# Patient Record
Sex: FEMALE | Race: WHITE | HISPANIC OR LATINO | Employment: FULL TIME | URBAN - METROPOLITAN AREA
[De-identification: names, ages, dates, MRNs, and addresses within clinical notes are randomized per-mention and may not be internally consistent; named-entity substitution may affect disease eponyms.]

---

## 2022-03-31 ENCOUNTER — OFFICE VISIT (OUTPATIENT)
Dept: MEDICAL GROUP | Facility: PHYSICIAN GROUP | Age: 57
End: 2022-03-31
Payer: COMMERCIAL

## 2022-03-31 VITALS
BODY MASS INDEX: 37.54 KG/M2 | HEART RATE: 83 BPM | TEMPERATURE: 97.8 F | RESPIRATION RATE: 16 BRPM | OXYGEN SATURATION: 96 % | DIASTOLIC BLOOD PRESSURE: 80 MMHG | SYSTOLIC BLOOD PRESSURE: 124 MMHG | WEIGHT: 233.6 LBS | HEIGHT: 66 IN

## 2022-03-31 DIAGNOSIS — R63.5 WEIGHT GAIN: ICD-10-CM

## 2022-03-31 DIAGNOSIS — L08.9 SKIN INFECTION: ICD-10-CM

## 2022-03-31 DIAGNOSIS — E55.9 VITAMIN D DEFICIENCY: ICD-10-CM

## 2022-03-31 DIAGNOSIS — F32.A DEPRESSION, UNSPECIFIED DEPRESSION TYPE: ICD-10-CM

## 2022-03-31 DIAGNOSIS — Z00.00 WELLNESS EXAMINATION: ICD-10-CM

## 2022-03-31 DIAGNOSIS — R06.83 SNORES: ICD-10-CM

## 2022-03-31 DIAGNOSIS — N20.0 KIDNEY STONE: ICD-10-CM

## 2022-03-31 PROCEDURE — 99204 OFFICE O/P NEW MOD 45 MIN: CPT | Performed by: FAMILY MEDICINE

## 2022-03-31 RX ORDER — CEPHALEXIN 500 MG/1
500 CAPSULE ORAL 2 TIMES DAILY
Qty: 14 CAPSULE | Refills: 0 | Status: SHIPPED | OUTPATIENT
Start: 2022-03-31 | End: 2022-04-07

## 2022-03-31 ASSESSMENT — PATIENT HEALTH QUESTIONNAIRE - PHQ9
CLINICAL INTERPRETATION OF PHQ2 SCORE: 2
SUM OF ALL RESPONSES TO PHQ QUESTIONS 1-9: 9
5. POOR APPETITE OR OVEREATING: 1 - SEVERAL DAYS

## 2022-03-31 NOTE — PROGRESS NOTES
Subjective:     CC:    Chief Complaint   Patient presents with   • Establish Care       HISTORY OF THE PRESENT ILLNESS: Patient is a 56 y.o. female. This pleasant patient is here today to establish care.  Patient has a number of complaints she is really feeling tired and at times depressed.  Patient is nearing the anniversary of her mother's death and she feels like this is playing on her depression.  Patient does not want to be on any medication for this but is willing to have some counseling done.  Patient is also having problems with weight gain and fatigue at times.  Patient does have a history of a kidney stone in the past x2 and it sounds like she may have had a stent placement for possible removal of the stone.  Patient is not have any issues issues with pain at this time.  Patient does have a red lump on her left breast that she noted there was some whitish material she would like me to take a look at it this is been going on for the last couple weeks    No problem-specific Assessment & Plan notes found for this encounter.      Allergies: Patient has no allergy information on record.    Current Outpatient Medications Ordered in Epic   Medication Sig Dispense Refill   • cephALEXin (KEFLEX) 500 MG Cap Take 1 Capsule by mouth 2 times a day for 7 days. 14 Capsule 0     No current Epic-ordered facility-administered medications on file.       History reviewed. No pertinent past medical history.    Past Surgical History:   Procedure Laterality Date   • CYSTOSCOPY STENT PLACEMENT      This was probably removed but this was due to a kidney stone       Social History     Tobacco Use   • Smoking status: Current Every Day Smoker     Packs/day: 0.50     Types: Cigarettes   • Smokeless tobacco: Never Used   Vaping Use   • Vaping Use: Never used   Substance Use Topics   • Alcohol use: Yes     Comment: Occ   • Drug use: Never       Social History     Social History Narrative   • Not on file       Family History   Problem  "Relation Age of Onset   • Breast Cancer Mother    • Breast Cancer Maternal Grandmother    • Diabetes Paternal Grandmother        Health Maintenance: Completed    ROS:   Gen: no fevers/chills  Eyes: no changes in vision  ENT: no sore throat, no hearing loss, no bloody nose  Pulm: no sob, no cough  CV: no chest pain, no palpitations  GI: no nausea/vomiting, no diarrhea  : no dysuria  Neuro: no headaches, no numbness/tingling  Heme/Lymph: no easy bruising      Objective:     Exam: /80   Pulse 83   Temp 36.6 °C (97.8 °F)   Resp 16   Ht 1.68 m (5' 6.14\")   Wt 106 kg (233 lb 9.6 oz)   SpO2 96%  Body mass index is 37.54 kg/m².    Gen: Alert and oriented, No apparent distress.  Skin: Warm and dry.  No obvious lesions.  Eyes: Sclera wnl Pupils normal in size  Lungs: Normal effort, CTA bilaterally, no wheezes, rhonchi, or rales  CV: Regular rate and rhythm. No murmurs, rubs, or gallops.  Breasts: Patient did show me her left breast and there is a small red area noted that slightly tender looks like a localized infection about the size of a quarter.  Musculoskeletal: Normal gait. No extremity cyanosis, clubbing, or edema.  Neuro: Oriented to person, place and time  Psych: Mood is wnl     Labs: Fasting labs were ordered patient given a listing of all the [facility] labs    Assessment & Plan:   56 y.o. female with the following -    1. Kidney stone  I would recommend we get a uric acid and also urinalysis patient is asymptomatic at this time this is a chronic problem  - URIC ACID; Future  - URINALYSIS,CULTURE IF INDICATED; Future    2. Weight gain  I would recommend that we get lab work done this is a chronic problem  - TSH; Future  - TRIIDOTHYRONINE; Future    3. Skin infection  I will place patient on antibiotics next time I see her we will need to do a breast exam and at that time order a mammogram this is a acute problem  - CBC WITH DIFFERENTIAL; Future  - Comp Metabolic Panel; Future    4. Depression, unspecified " depression type  We will get lab work done I recommended possible medication but patient does not want to be on any meds.  So patient was agreeable for counseling we will go ahead and order this this is a chronic problem  - Comp Metabolic Panel; Future  - TSH; Future  - TRIIDOTHYRONINE; Future    5. Wellness examination  - Lipid Profile; Future    6. Vitamin D deficiency  We will go ahead and check her vitamin D level this is a chronic problem  - VITAMIN D,25 HYDROXY; Future    Other orders  - cephALEXin (KEFLEX) 500 MG Cap; Take 1 Capsule by mouth 2 times a day for 7 days.  Dispense: 14 Capsule; Refill: 0       Return in about 2 weeks (around 4/14/2022).    Please note that this dictation was created using voice recognition software. I have made every reasonable attempt to correct obvious errors, but I expect that there are errors of grammar and possibly content that I did not discover before finalizing the note.

## 2022-04-02 ENCOUNTER — HOSPITAL ENCOUNTER (OUTPATIENT)
Dept: LAB | Facility: MEDICAL CENTER | Age: 57
End: 2022-04-02
Attending: FAMILY MEDICINE
Payer: COMMERCIAL

## 2022-04-02 DIAGNOSIS — L08.9 SKIN INFECTION: ICD-10-CM

## 2022-04-02 DIAGNOSIS — R63.5 WEIGHT GAIN: ICD-10-CM

## 2022-04-02 DIAGNOSIS — N20.0 KIDNEY STONE: ICD-10-CM

## 2022-04-02 DIAGNOSIS — E55.9 VITAMIN D DEFICIENCY: ICD-10-CM

## 2022-04-02 DIAGNOSIS — Z00.00 WELLNESS EXAMINATION: ICD-10-CM

## 2022-04-02 DIAGNOSIS — F32.A DEPRESSION, UNSPECIFIED DEPRESSION TYPE: ICD-10-CM

## 2022-04-02 LAB
25(OH)D3 SERPL-MCNC: 21 NG/ML (ref 30–100)
ALBUMIN SERPL BCP-MCNC: 4.4 G/DL (ref 3.2–4.9)
ALBUMIN/GLOB SERPL: 1.7 G/DL
ALP SERPL-CCNC: 85 U/L (ref 30–99)
ALT SERPL-CCNC: 14 U/L (ref 2–50)
ANION GAP SERPL CALC-SCNC: 11 MMOL/L (ref 7–16)
APPEARANCE UR: CLEAR
AST SERPL-CCNC: 14 U/L (ref 12–45)
BACTERIA #/AREA URNS HPF: NEGATIVE /HPF
BASOPHILS # BLD AUTO: 0.8 % (ref 0–1.8)
BASOPHILS # BLD: 0.07 K/UL (ref 0–0.12)
BILIRUB SERPL-MCNC: 0.6 MG/DL (ref 0.1–1.5)
BILIRUB UR QL STRIP.AUTO: NEGATIVE
BUN SERPL-MCNC: 15 MG/DL (ref 8–22)
CALCIUM SERPL-MCNC: 9.6 MG/DL (ref 8.5–10.5)
CHLORIDE SERPL-SCNC: 108 MMOL/L (ref 96–112)
CHOLEST SERPL-MCNC: 184 MG/DL (ref 100–199)
CO2 SERPL-SCNC: 23 MMOL/L (ref 20–33)
COLOR UR: YELLOW
CREAT SERPL-MCNC: 0.67 MG/DL (ref 0.5–1.4)
EOSINOPHIL # BLD AUTO: 0.36 K/UL (ref 0–0.51)
EOSINOPHIL NFR BLD: 4 % (ref 0–6.9)
EPI CELLS #/AREA URNS HPF: ABNORMAL /HPF
ERYTHROCYTE [DISTWIDTH] IN BLOOD BY AUTOMATED COUNT: 50.6 FL (ref 35.9–50)
GFR SERPLBLD CREATININE-BSD FMLA CKD-EPI: 104 ML/MIN/1.73 M 2
GLOBULIN SER CALC-MCNC: 2.6 G/DL (ref 1.9–3.5)
GLUCOSE SERPL-MCNC: 97 MG/DL (ref 65–99)
GLUCOSE UR STRIP.AUTO-MCNC: NEGATIVE MG/DL
HCT VFR BLD AUTO: 48.6 % (ref 37–47)
HDLC SERPL-MCNC: 49 MG/DL
HGB BLD-MCNC: 15.9 G/DL (ref 12–16)
HYALINE CASTS #/AREA URNS LPF: ABNORMAL /LPF
IMM GRANULOCYTES # BLD AUTO: 0.03 K/UL (ref 0–0.11)
IMM GRANULOCYTES NFR BLD AUTO: 0.3 % (ref 0–0.9)
KETONES UR STRIP.AUTO-MCNC: NEGATIVE MG/DL
LDLC SERPL CALC-MCNC: 118 MG/DL
LEUKOCYTE ESTERASE UR QL STRIP.AUTO: NEGATIVE
LYMPHOCYTES # BLD AUTO: 2.97 K/UL (ref 1–4.8)
LYMPHOCYTES NFR BLD: 32.9 % (ref 22–41)
MCH RBC QN AUTO: 31.5 PG (ref 27–33)
MCHC RBC AUTO-ENTMCNC: 32.7 G/DL (ref 33.6–35)
MCV RBC AUTO: 96.4 FL (ref 81.4–97.8)
MICRO URNS: ABNORMAL
MONOCYTES # BLD AUTO: 0.48 K/UL (ref 0–0.85)
MONOCYTES NFR BLD AUTO: 5.3 % (ref 0–13.4)
NEUTROPHILS # BLD AUTO: 5.13 K/UL (ref 2–7.15)
NEUTROPHILS NFR BLD: 56.7 % (ref 44–72)
NITRITE UR QL STRIP.AUTO: NEGATIVE
NRBC # BLD AUTO: 0 K/UL
NRBC BLD-RTO: 0 /100 WBC
PH UR STRIP.AUTO: 5.5 [PH] (ref 5–8)
PLATELET # BLD AUTO: 279 K/UL (ref 164–446)
PMV BLD AUTO: 10.2 FL (ref 9–12.9)
POTASSIUM SERPL-SCNC: 4.2 MMOL/L (ref 3.6–5.5)
PROT SERPL-MCNC: 7 G/DL (ref 6–8.2)
PROT UR QL STRIP: 30 MG/DL
RBC # BLD AUTO: 5.04 M/UL (ref 4.2–5.4)
RBC # URNS HPF: ABNORMAL /HPF
RBC UR QL AUTO: ABNORMAL
SODIUM SERPL-SCNC: 142 MMOL/L (ref 135–145)
SP GR UR STRIP.AUTO: 1.02
T3 SERPL-MCNC: 123 NG/DL (ref 60–181)
TRIGL SERPL-MCNC: 85 MG/DL (ref 0–149)
TSH SERPL DL<=0.005 MIU/L-ACNC: 1.41 UIU/ML (ref 0.38–5.33)
URATE SERPL-MCNC: 4.5 MG/DL (ref 1.9–8.2)
UROBILINOGEN UR STRIP.AUTO-MCNC: 0.2 MG/DL
WBC # BLD AUTO: 9 K/UL (ref 4.8–10.8)
WBC #/AREA URNS HPF: ABNORMAL /HPF

## 2022-04-02 PROCEDURE — 85025 COMPLETE CBC W/AUTO DIFF WBC: CPT

## 2022-04-02 PROCEDURE — 82306 VITAMIN D 25 HYDROXY: CPT

## 2022-04-02 PROCEDURE — 80061 LIPID PANEL: CPT

## 2022-04-02 PROCEDURE — 80053 COMPREHEN METABOLIC PANEL: CPT

## 2022-04-02 PROCEDURE — 84443 ASSAY THYROID STIM HORMONE: CPT

## 2022-04-02 PROCEDURE — 84550 ASSAY OF BLOOD/URIC ACID: CPT

## 2022-04-02 PROCEDURE — 84480 ASSAY TRIIODOTHYRONINE (T3): CPT

## 2022-04-02 PROCEDURE — 36415 COLL VENOUS BLD VENIPUNCTURE: CPT

## 2022-04-02 PROCEDURE — 81001 URINALYSIS AUTO W/SCOPE: CPT

## 2022-04-07 ENCOUNTER — OFFICE VISIT (OUTPATIENT)
Dept: MEDICAL GROUP | Facility: PHYSICIAN GROUP | Age: 57
End: 2022-04-07
Payer: COMMERCIAL

## 2022-04-07 VITALS
TEMPERATURE: 98 F | BODY MASS INDEX: 37.51 KG/M2 | HEART RATE: 84 BPM | WEIGHT: 233.4 LBS | HEIGHT: 66 IN | RESPIRATION RATE: 18 BRPM | SYSTOLIC BLOOD PRESSURE: 126 MMHG | DIASTOLIC BLOOD PRESSURE: 78 MMHG | OXYGEN SATURATION: 97 %

## 2022-04-07 DIAGNOSIS — Z00.00 WELLNESS EXAMINATION: ICD-10-CM

## 2022-04-07 DIAGNOSIS — E55.9 VITAMIN D DEFICIENCY: ICD-10-CM

## 2022-04-07 DIAGNOSIS — N20.0 KIDNEY STONE: ICD-10-CM

## 2022-04-07 DIAGNOSIS — R31.29 MICROSCOPIC HEMATURIA: ICD-10-CM

## 2022-04-07 DIAGNOSIS — E78.00 ELEVATED LDL CHOLESTEROL LEVEL: ICD-10-CM

## 2022-04-07 DIAGNOSIS — Z12.31 ENCOUNTER FOR SCREENING MAMMOGRAM FOR MALIGNANT NEOPLASM OF BREAST: ICD-10-CM

## 2022-04-07 DIAGNOSIS — R63.5 WEIGHT GAIN: ICD-10-CM

## 2022-04-07 DIAGNOSIS — L08.9 SKIN INFECTION: ICD-10-CM

## 2022-04-07 PROCEDURE — 99214 OFFICE O/P EST MOD 30 MIN: CPT | Performed by: FAMILY MEDICINE

## 2022-04-07 ASSESSMENT — FIBROSIS 4 INDEX: FIB4 SCORE: 0.72

## 2022-04-07 NOTE — PROGRESS NOTES
"Subjective:     CC:   Chief Complaint   Patient presents with   • Follow-Up       HPI:   Cheyenne presents today for follow-up of her labs.  Patient states the area on her breast has resolved.  Patient is agreeable to go ahead and get a mammogram done.  Also discussed about getting a screening colonoscopy which patient was agreeable to get done also.  Patient did get her Covid vaccine so at this time does not want to get her tetanus shot.    History reviewed. No pertinent past medical history.    Social History     Tobacco Use   • Smoking status: Current Every Day Smoker     Packs/day: 0.50     Types: Cigarettes   • Smokeless tobacco: Never Used   Vaping Use   • Vaping Use: Never used   Substance Use Topics   • Alcohol use: Yes     Comment: Occ   • Drug use: Never       Current Outpatient Medications Ordered in Epic   Medication Sig Dispense Refill   • cephALEXin (KEFLEX) 500 MG Cap Take 1 Capsule by mouth 2 times a day for 7 days. 14 Capsule 0     No current Epic-ordered facility-administered medications on file.       Allergies:  Patient has no allergy information on record.    Health Maintenance: Completed    ROS:  Gen: no fevers/chills, no changes in weight  Eyes: no changes in vision  ENT: no sore throat, no hearing loss, no bloody nose  Pulm: no sob, no cough  CV: no chest pain, no palpitations  GI: no nausea/vomiting, no diarrhea  : no dysuria  Neuro: no headaches, no numbness/tingling  Heme/Lymph: no easy bruising    Objective:     Exam:  /78   Pulse 84   Temp 36.7 °C (98 °F)   Resp 18   Ht 1.676 m (5' 6\")   Wt 106 kg (233 lb 6.4 oz)   SpO2 97%   BMI 37.67 kg/m²  Body mass index is 37.67 kg/m².    Gen: Alert and oriented, No apparent distress.  Skin: Warm and dry.  No obvious lesions.  Eyes: Sclera wnl Pupils normal in size  Lungs: Normal effort, CTA bilaterally, no wheezes, rhonchi, or rales  CV: Regular rate and rhythm. No murmurs, rubs, or gallops.  ABD: Soft non-tender no " organomegaly  Musculoskeletal: Normal gait. No extremity cyanosis, clubbing, or edema.  Neuro: Oriented to person, place and time  Psych: Mood is wnl       Assessment & Plan:     56 y.o. female with the following -     1. Kidney stone  Patient does have a little bit of red blood cells in her urine I will recommend referring to urology to see if any further work-up needs to be done this is a chronic problem  - Referral to Urology    2. Microscopic hematuria  Will refer to urology this is a chronic problem  - Referral to Urology    3. Encounter for screening mammogram for malignant neoplasm of breast  Patient has not noticed any abnormal lumps we will go ahead and order her mammogram also recommend when I see her back we do her female exam  - MA-SCREENING MAMMO BILAT W/TOMOSYNTHESIS W/O CAD; Future    4. Wellness examination  Patient would like to go ahead and see GI for screening colonoscopy referral was written  - Referral to Gastroenterology    5. Skin infection  Patient's infection on her breast has resolved we will go ahead and order a mammogram this is resolved problem    6. Weight gain  Would recommend increase activity level I did discuss that with her concerning her LDL her HDL is in the 40s would like it higher.  We will see patient back in a couple months and see how she is doing this is a chronic problem  7. Vitamin D deficiency  Patient's vitamin D level is low I wrote on his sheet of paper to give the patient vitamin D at 5000 IUs/day would like her to start this will probably repeat this in another 4 months we will repeat her lipid panel this is a chronic problem    8. Elevated LDL cholesterol level  I did go through detail her cholesterol, triglycerides and HDL I would like her HDL higher which will probably lower her LDL.  We will place her on a calendar repeat this in 4 months this is a chronic problem       Return in about 2 months (around 6/7/2022).    Please note that this dictation was created  using voice recognition software. I have made every reasonable attempt to correct obvious errors, but I expect that there are errors of grammar and possibly content that I did not discover before finalizing the note.

## 2022-04-14 ENCOUNTER — OFFICE VISIT (OUTPATIENT)
Dept: BEHAVIORAL HEALTH | Facility: CLINIC | Age: 57
End: 2022-04-14
Payer: COMMERCIAL

## 2022-04-14 DIAGNOSIS — Z63.4 BEREAVEMENT: ICD-10-CM

## 2022-04-14 DIAGNOSIS — F43.21 COMPLICATED BEREAVEMENT: ICD-10-CM

## 2022-04-14 PROCEDURE — 90791 PSYCH DIAGNOSTIC EVALUATION: CPT | Performed by: PSYCHOLOGIST

## 2022-04-14 SDOH — SOCIAL STABILITY - SOCIAL INSECURITY: DISSAPEARANCE AND DEATH OF FAMILY MEMBER: Z63.4

## 2022-04-15 PROBLEM — Z63.4 BEREAVEMENT: Status: ACTIVE | Noted: 2022-04-15

## 2022-04-15 ASSESSMENT — ANXIETY QUESTIONNAIRES
GAD7 TOTAL SCORE: 5
4. TROUBLE RELAXING: NOT AT ALL
3. WORRYING TOO MUCH ABOUT DIFFERENT THINGS: SEVERAL DAYS
6. BECOMING EASILY ANNOYED OR IRRITABLE: NOT AT ALL
IF YOU CHECKED OFF ANY PROBLEMS ON THIS QUESTIONNAIRE, HOW DIFFICULT HAVE THESE PROBLEMS MADE IT FOR YOU TO DO YOUR WORK, TAKE CARE OF THINGS AT HOME, OR GET ALONG WITH OTHER PEOPLE: SOMEWHAT DIFFICULT
2. NOT BEING ABLE TO STOP OR CONTROL WORRYING: SEVERAL DAYS
5. BEING SO RESTLESS THAT IT IS HARD TO SIT STILL: NOT AT ALL
7. FEELING AFRAID AS IF SOMETHING AWFUL MIGHT HAPPEN: SEVERAL DAYS
1. FEELING NERVOUS, ANXIOUS, OR ON EDGE: MORE THAN HALF THE DAYS

## 2022-04-15 ASSESSMENT — PATIENT HEALTH QUESTIONNAIRE - PHQ9
SUM OF ALL RESPONSES TO PHQ QUESTIONS 1-9: 13
5. POOR APPETITE OR OVEREATING: 2 - MORE THAN HALF THE DAYS
CLINICAL INTERPRETATION OF PHQ2 SCORE: 4

## 2022-04-16 NOTE — PROGRESS NOTES
Name: Cheyenne Kelley Date: [birth year] Time in session 60 minutes   [x] Initial Dx Eval [] Family [] Cancelled/Rescheduled [] Office visit   [] Individual [] Group [] Late Cancellation [] Virtual Session   [] Couple [] Testing [] No call/No show [] Late   [] Discharge [] Phone [x] On time: 10:00 AM []  (1)   Attended: Ms. Kelley (She wore a mask)    Observations/ Appearance/ Affect: Ms. Kelley appeared clean, well-groomed, and dressed in warm clothes. She was oriented to person, place, time and purpose, was pleasant and cooperative, lucid and articulate.  Her affect was anxious and mood was sad. No suicidal or homicidal ideation described or reported. No reports of hallucinations or confusions. She participated well in this session.   Content/ Topics: Ms. Kelley reported that she was depressed. She reported that she two years ago (2020) her mother  from a stroke. She reported that several years ago she had a significant other who also  suddenly. She reported that when she was [age] years old her mother “gave her away” to her maternal aunt, to raise her. She reported that during her childhood she thought her aunt and uncle were her parents and that she had a close and loving relationship with them. She reported that how once in a while her aunt (her mother) would visit and they would do things together and how odd it was for her to hear her aunt sometime refer to her as her daughter. She described how she was sad when her father was dying when she was [age] years old. She reported that her father tried to tell her but could not. She reported that she did get into some papers at home that showed her that her aunt was really her biological mother. She reported that she confronted her mother and aunt about their decision and that they wanted a better life for her. She reported that she was very angry about it for a long time. She reported that her aunt  around . She reported  that she did forgive her. She reported that her mother raised her oldest son. She reported that two years ago her mother was ill and her son called her and told her that she had to come and see grandma. She reported that her mother lived in [place] [facility] and she was living in [place], [place]. She reported that she came out and was able to have a good talk with her mother that they worked it out and she was able to forgive her mother. She reported that she went back to [place] but her mother’s health was deteriorating and her son again called her and told her to get back to see her. She reported that her mother lived long enough to see her one more time. She reported that she had several children and had several grandchildren. She reported that her daughter was pregnant. She reported that she had problems with drug about when she was younger but she has been clean and sober for many years.    Risk issues assessed: Ms. Kelley reported that she was depressed over the loss of her mother and significant other many years ago. She reported that her current relationship was not doing so well and that they were probably going to break-up next month when the lease on their apartment runs out. She reported that he has been cheating on her. Discussed with Ms. Kelley her current concerns regarding the Coronavirus and her concerns about contagion. Discussed how any information about this virus may be found on the Center for Disease Control website or the [facility] Coronavirus Resource Center ([email or web address].[facility].edu/) for accurate information about it. Discussed how recommended precautions seemed prudent: wash hand frequently, avoid crowds, wear masks when out and about, maintain social distancing ( ? 2 meters) cover mouth when coughing, and stay home if sick until better. Discussed how [facility] is providing Telehealth psychotherapy services using the [employer] Platform in a safe, secure and high-quality format.  Discussed using the Zoom Platform if needed. Discussed how the electronic record keeping platform here at [facility] can limit access to the psychotherapy notes and that this psychologist must quiana the notes as sensitive. Encouraged her to discuss with this psychologist any difficulty she may have in accessing her note. She consented to the additional firewalls. (PHQ-9 = 13, SAMIR-7 = 5). She reported that she would not harm herself and did not intend to harm herself or another.   Psychosocial and environmental problems addressed: Discussed with Ms. Kelley how difficult it is to process sudden loss. Discussed with Ms. Kelley how unexpected grief was a particularly complicated form of grief. Discussed how families and friends often feel overwhelmed with shock and anxiety and confusion. Encouraged her to read the work of Chelsy Montgomery and encourage all family members and friends to seek professional support and counseling. Discussed with Ms. Kelley she will find ways to memorialize her mother and significant other. Ms. Kelley grieved in this session. Support given. Discussed how this was complicated grief and this supportive psychotherapy will focus on her health and well-being and support her grieving.   Current GAF: 55   Current medications: None.   Significant/ Recent Events: Discussed with Ms. Kelley how the threat from loss is from something we cannot see and we feel powerless and helpless. Sudden loss interrupts the process of connection and compromises our ability to engage with others by replacing patterns of connection with patterns of protection. Our everyday routine is gone and there is no sense of normalcy. We do not know when this well end. When the dorsal vagus comes to the rescue, there is not enough energy to run the system. The system is drained and the person is numbed.  In a sympathetic nervous system response, there is too much energy in the system and the client is flooded. And in a ventral vagal  state the system is regulated, open to connection, and the client is ready to engage. Discussed how we are processing the loss and re-patterning. Discussed how she is identifying and expressing feelings and re-pattering to living without her mother and others. Discussed how there is meaning to find in loss. Discussed how here there is time to grieve and interpret normal behavior for her and support her individuality. Discussed how the diagnosis of Complicated Bereavement was present. Discussed how this psychotherapy would be informed by Polyvagal Theory, Positive Psychology (Mindful Self-Compassion), and how she will develop Cognitive Behavioral Skills and Strategies to process her losses, decrease her anxieties and fears and gain control over her life, develop a healthier lifestyle, increase restful sleep and find meaning and balance in her life. She asked to return to [facility] weekly to develop this supportive psychotherapy.    Informed consent issues discussed: Ms. Kelley reported that she understood the process of this evaluation agreed to return to [facility]. Discussed how she expressed her desire to change and was willing to start the process. She reported that she was willing to make changes to her life toward a healthy lifestyle. She reported that she was aware of the problems she experienced and expressed the desire to solve those problems safely. She reported that she had a positive outlook for change. She reported that she had family and friends that were supportive and knew she needed to gain and maintain a healthy network of support.    Assignments/ Homework: Ms. Kelley agreed to initiate some of the strategies discussed today to increase restful sleep and decrease anxieties.    Plan: Ms. Kelley agreed to closely monitor her mood and behavior.    Diagnoses: F43.21 Complicated Bereavement [] Provisional   Treatment Plan: [] Continued [x] New [] Replaced Referral:   Suicidal [] Yes [x] No [] Medical:    Violence:  [] Yes [x] No [] Psychiatric:    Next session:     [] Neurological:          Lamont Rachel Psy.D.  Clinical Psychologist  [facility]  NPI: [id number]

## 2022-04-28 ENCOUNTER — OFFICE VISIT (OUTPATIENT)
Dept: BEHAVIORAL HEALTH | Facility: CLINIC | Age: 57
End: 2022-04-28
Payer: COMMERCIAL

## 2022-04-28 DIAGNOSIS — F43.21 COMPLICATED BEREAVEMENT: ICD-10-CM

## 2022-04-28 PROCEDURE — 90837 PSYTX W PT 60 MINUTES: CPT | Performed by: PSYCHOLOGIST

## 2022-04-28 ASSESSMENT — ANXIETY QUESTIONNAIRES
1. FEELING NERVOUS, ANXIOUS, OR ON EDGE: SEVERAL DAYS
3. WORRYING TOO MUCH ABOUT DIFFERENT THINGS: MORE THAN HALF THE DAYS
6. BECOMING EASILY ANNOYED OR IRRITABLE: NOT AT ALL
IF YOU CHECKED OFF ANY PROBLEMS ON THIS QUESTIONNAIRE, HOW DIFFICULT HAVE THESE PROBLEMS MADE IT FOR YOU TO DO YOUR WORK, TAKE CARE OF THINGS AT HOME, OR GET ALONG WITH OTHER PEOPLE: NOT DIFFICULT AT ALL
7. FEELING AFRAID AS IF SOMETHING AWFUL MIGHT HAPPEN: SEVERAL DAYS
5. BEING SO RESTLESS THAT IT IS HARD TO SIT STILL: SEVERAL DAYS
4. TROUBLE RELAXING: MORE THAN HALF THE DAYS
2. NOT BEING ABLE TO STOP OR CONTROL WORRYING: NOT AT ALL
GAD7 TOTAL SCORE: 7

## 2022-04-28 ASSESSMENT — PATIENT HEALTH QUESTIONNAIRE - PHQ9
5. POOR APPETITE OR OVEREATING: 2 - MORE THAN HALF THE DAYS
CLINICAL INTERPRETATION OF PHQ2 SCORE: 3
SUM OF ALL RESPONSES TO PHQ QUESTIONS 1-9: 11

## 2022-04-29 NOTE — PROGRESS NOTES
Name: Cheyenne Kelley Date: [birth year]  [birth year] Time in session 60 minutes   [] Initial Dx Eval [] Family [] Cancelled/Rescheduled [] Office visit   [x] Individual [] Group [] Late Cancellation [] Virtual Session   [] Couple [] Testing [] No call/No show [] Late   [] Discharge [] Phone [x] On time: 10:00 AM []  (2)   Attended: Ms. Kelley (She wore a mask)    Observations/ Appearance/ Affect: Ms. Kelley appeared clean, well-groomed, and dressed in warm clothes. She was oriented to person, place, time and purpose, was pleasant and cooperative, lucid and articulate.  Her affect was anxious and mood was sad. No suicidal or homicidal ideation described or reported. No reports of hallucinations or confusions. She participated well in this session.   Content/ Topics: Ms. Kelley reported that she was doing well. She reported that was sleeping better and eating healthier. She reported that she enjoyed her work. 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 Discussed how the work here has been focused on the impact sudden loss had on a person and a family. Discussed the sudden loss of her mother last session. Discussed how the threat from loss is from something we cannot see and we feel powerless and helpless. Sudden loss interrupts the process of connection and compromises our ability to engage with others by replacing patterns of connection with patterns of protection. Our everyday routine is gone and there is no sense of normalcy. We do not know when this well end. When the dorsal vagus comes to the rescue, there is not enough energy to run the system. The system is drained and the person is numbed.  In a sympathetic nervous system response, there is too much energy in the system and the client is flooded. And in a ventral vagal state the system is regulated, open to connection, and the client is ready to engage. Discussed how she is processing the loss and  re-patterning. Discussed how she is identifying and expressing feelings and re-pattering to living without the . Discussed how there is meaning to find in loss. Discussed how here at [facility], there is time to grieve and interpret normal behavior for her and support her individuality. Discussed her , Dante, who  suddenly several years ago. She reported that he had liver disease from drinking too much and decided to kill himself without telling anyone. She reported that she knew he was in a lot of pain and was suffering.   Risk issues assessed: Ms. Kelley reported that she was much less depressed over the loss of her mother and Dante as she was before she came to [facility]. She reported that her current relationship was somewhat better this week. Discussed how the electronic record keeping platform here at [facility] can limit access to the psychotherapy notes and that this psychologist must quiana the notes as sensitive. Encouraged her to discuss with this psychologist any difficulty she may have in accessing her note. She consented to the additional firewalls. (PHQ-9 = 11, SAMIR-7 = 7). She reported that she would not harm herself and did not intend to harm herself or another.   Psychosocial and environmental problems addressed: Discussed with Ms. Kelley how difficult it is to process sudden loss. Discussed with Ms. Kelley how unexpected grief was a particularly complicated form of grief. Discussed how families and friends often feel overwhelmed with shock and anxiety and confusion. Discussed how this was complicated grief and this supportive psychotherapy will focus on her health and well-being and support her grieving.   Current GAF: 55   Current medications: None.   Significant/ Recent Events: Ms. Kelley reported that she was talking more about her mother and Dante with her family. She reported that one of her son’s was getting  next month. She reported that they did not always get along but their talks  about Dante brought them closer and now they were very close. She reported that at the wedding he had a song and a dance he wanted to have with her. She reported that it was probably the song she and Dante liked and how when the dance to it that it will make them cry and think of him fondly.  Discussed how there is meaning to find in loss. Discussed how this psychotherapy would be informed by Polyvagal Theory, Positive Psychology (Mindful Self-Compassion), and how she will develop Cognitive Behavioral Skills and Strategies to process her losses, decrease her anxieties and fears and gain control over her life, develop a healthier lifestyle, increase restful sleep and find meaning and balance in her life. She reported that she was doing well and asked to return to this supportive psychotherapy after the wedding. Discussed the work she had done in these sessions. Discussed how in Polyvagal Theory the skill of savoring is to bring active awareness to moments of connection and safety help to interrupt the pattern of negativity and support positive change: savoring the state of connection and savor the experience of safety. Discussed how she is re-patterning her life without her mother and Dante. Discussed how she is finding meaning in her relationships with her family and friends and was feeling better about herself.    Informed consent issues discussed: Ms. Kelley she expressed her desire to change and was willing to start the process. She reported that she was willing to make changes to her life toward a healthy lifestyle. She reported that she was aware of the problems she experienced and expressed the desire to solve those problems safely. She reported that she had a positive outlook for change. She reported that she had family and friends that were supportive and knew she needed to gain and maintain a healthy network of support.    Assignments/ Homework: Ms. Kelley agreed to initiate some of the strategies  discussed today to increase restful sleep and decrease anxieties.    Plan: Ms. Kelley agreed to closely monitor her mood and behavior.    Diagnoses: F43.21 Complicated Bereavement [] Provisional   Treatment Plan: [x] Continued [] New [] Replaced Referral:   Suicidal [] Yes [x] No [] Medical:    Violence: [] Yes [x] No [] Psychiatric:    Next session:     [] Neurological:          Lamont Rachel Psy.D.  Clinical Psychologist  [facility]  NPI: [id number]

## 2022-05-12 ENCOUNTER — OFFICE VISIT (OUTPATIENT)
Dept: BEHAVIORAL HEALTH | Facility: CLINIC | Age: 57
End: 2022-05-12
Payer: COMMERCIAL

## 2022-05-12 DIAGNOSIS — F43.21 COMPLICATED BEREAVEMENT: ICD-10-CM

## 2022-05-12 PROCEDURE — 90837 PSYTX W PT 60 MINUTES: CPT | Performed by: PSYCHOLOGIST

## 2022-05-12 ASSESSMENT — ANXIETY QUESTIONNAIRES
1. FEELING NERVOUS, ANXIOUS, OR ON EDGE: MORE THAN HALF THE DAYS
GAD7 TOTAL SCORE: 9
3. WORRYING TOO MUCH ABOUT DIFFERENT THINGS: MORE THAN HALF THE DAYS
7. FEELING AFRAID AS IF SOMETHING AWFUL MIGHT HAPPEN: MORE THAN HALF THE DAYS
6. BECOMING EASILY ANNOYED OR IRRITABLE: NOT AT ALL
IF YOU CHECKED OFF ANY PROBLEMS ON THIS QUESTIONNAIRE, HOW DIFFICULT HAVE THESE PROBLEMS MADE IT FOR YOU TO DO YOUR WORK, TAKE CARE OF THINGS AT HOME, OR GET ALONG WITH OTHER PEOPLE: SOMEWHAT DIFFICULT
2. NOT BEING ABLE TO STOP OR CONTROL WORRYING: NOT AT ALL
5. BEING SO RESTLESS THAT IT IS HARD TO SIT STILL: SEVERAL DAYS
4. TROUBLE RELAXING: MORE THAN HALF THE DAYS

## 2022-05-12 ASSESSMENT — PATIENT HEALTH QUESTIONNAIRE - PHQ9
5. POOR APPETITE OR OVEREATING: 2 - MORE THAN HALF THE DAYS
CLINICAL INTERPRETATION OF PHQ2 SCORE: 5
SUM OF ALL RESPONSES TO PHQ QUESTIONS 1-9: 16

## 2022-05-12 NOTE — PROGRESS NOTES
Name: Cheyenne Kelley Date: [birth year]  [birth year] Time in session 60 minutes   [] Initial Dx Eval [] Family [] Cancelled/Rescheduled [] Office visit   [x] Individual [] Group [] Late Cancellation [] Virtual Session   [] Couple [] Testing [] No call/No show [] Late   [] Discharge [] Phone [x] On time: 11:00 AM []  (3)   Attended: Ms. Kelley (She wore a mask)    Observations/ Appearance/ Affect: Ms. Kelley appeared clean, well-groomed, and dressed in warm clothes. She was oriented to person, place, time and purpose, was pleasant and cooperative, lucid and articulate.  Her affect was anxious and mood was sad. No suicidal or homicidal ideation described or reported. No reports of hallucinations or confusions. She participated well in this session.   Content/ Topics: Ms. Kelley reported that she had been depressed this week. She reported that she was sad to learn that her son and his girlfriend had a fight and called off the wedding. Discussed how they did live together and had four children. Discussed how they will likely work through whatever it was they were fighting about. She reported that there were two very important dates coming up; Dante  on May 27th and his birthday is on . She reported that one was a sad day and one was a happy day. She reported that she likes to keep busy on the sad day. Discussed how she worked two jobs and maybe she was tired and needed some rest. She reported that her son reminded her that she was not as young and needed to take better care of herself. She reported that she would make an appointment with her doctor and with a nutritionist. She reported that she had been working hard to cut down on sugar and salt in her diet. She reported that she can tell when she does not eat healthy because she does not feel so good later.  Discussed how the day Dante  was also the day that everything changed. Discussed how the work here has been focused on the impact sudden loss had on  a person and a family. Discussed the sudden loss of Dante last session. Discussed how the threat from loss is from something we cannot see and we feel powerless and helpless. Sudden loss interrupts the process of connection and compromises our ability to engage with others by replacing patterns of connection with patterns of protection. Our everyday routine is gone and there is no sense of normalcy. We do not know when this well end. When the dorsal vagus comes to the rescue, there is not enough energy to run the system. The system is drained and the person is numbed.  In a sympathetic nervous system response, there is too much energy in the system and the client is flooded. And in a ventral vagal state the system is regulated, open to connection, and the client is ready to engage. Discussed how she is processing the loss and re-patterning. Discussed how she is identifying and expressing feelings and re-pattering to living without the Dante and her mother. Discussed how there is meaning to find in loss. Discussed how here at [facility], there is time to grieve and interpret normal behavior for her and support her individuality.    Risk issues assessed: Ms. Kelley reported that her current relationship was somewhat better this week. Discussed how her relationship with her son was improving. (PHQ-9 = 16, SAMIR-7 = 9). She reported that she would not harm herself and did not intend to harm herself or another.   Psychosocial and environmental problems addressed: Discussed with Ms. Kelley how difficult it is to process sudden loss. Discussed how processing this complicated grief and this supportive psychotherapy will focus on her health and well-being and support her grieving. She reported that it was funny how when she was home and depressed and how she slept most of the day and how she wanted to call her son. She reported that she went to  the phone and it rang and it was him. She reported that they laughed about it.  She reported that on Mother’s day she got phone calls from her children and how those calls cheered her up.    Current GAF: 55   Current medications: None.   Significant/ Recent Events: Discusses strategies to manage and decrease anxieties and sadness. Discussed using Mindful Self-Compassion Skills to calm and sooth herself when she is experienced a painful moment of loss. Reminded her that a person can find meaning from loss. Discussed using Guided Imagery to find a place in her mind where she can go to feel safe and relaxed. She used guided imagery to go to her favorite ride at [employer]; [employer]. She reported that her other favorite place was the beach at [facility] in [place]. She used guided imagery to be on the beach there. Discussed how she can use these and other places in her mind to sooth herself and feel 100% safe there.  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 She reported that she did and that she hopes to get the position. She reported that she was feeling better now. Discussed how this psychotherapy would be informed by Polyvagal Theory, Positive Psychology (Mindful Self-Compassion), and how she will develop Cognitive Behavioral Skills and Strategies to process her losses, decrease her anxieties and fears and gain control over her life, develop a healthier lifestyle, increase restful sleep and find meaning and balance in her life. Discussed the work she had done in these sessions. Discussed how in Polyvagal Theory the skill of savoring is to bring active awareness to moments of connection and safety help to interrupt the pattern of negativity and support positive change: savoring the state of connection and savor the experience of safety. Discussed how she is re-patterning her life without her mother and Dante. Discussed how she is finding meaning in her relationships with her family and  friends and was feeling better about herself.    Informed consent issues discussed: Ms. Kelley she expressed her desire to change and was willing to start the process. She reported that she was willing to make changes to her life toward a healthy lifestyle. She reported that she was aware of the problems she experienced and expressed the desire to solve those problems safely. She reported that she had a positive outlook for change. She reported that she had family and friends that were supportive and knew she needed to gain and maintain a healthy network of support.    Assignments/ Homework: Ms. Kelley agreed to initiate some of the strategies discussed today to increase restful sleep and decrease anxieties.    Plan: Ms. Kelley agreed to closely monitor her mood and behavior.    Diagnoses: F43.21 Complicated Bereavement [] Provisional   Treatment Plan: [x] Continued [] New [] Replaced Referral:   Suicidal [] Yes [x] No [] Medical:    Violence: [] Yes [x] No [] Psychiatric:    Next session:     [] Neurological:          Lamont Rachel Psy.D.  Clinical Psychologist  [facility]  NPI: [id number]

## 2022-05-26 ENCOUNTER — OFFICE VISIT (OUTPATIENT)
Dept: BEHAVIORAL HEALTH | Facility: CLINIC | Age: 57
End: 2022-05-26
Payer: COMMERCIAL

## 2022-05-26 DIAGNOSIS — F43.21 COMPLICATED BEREAVEMENT: ICD-10-CM

## 2022-05-26 PROCEDURE — 90837 PSYTX W PT 60 MINUTES: CPT | Performed by: PSYCHOLOGIST

## 2022-05-26 ASSESSMENT — ANXIETY QUESTIONNAIRES
GAD7 TOTAL SCORE: 7
2. NOT BEING ABLE TO STOP OR CONTROL WORRYING: SEVERAL DAYS
4. TROUBLE RELAXING: SEVERAL DAYS
7. FEELING AFRAID AS IF SOMETHING AWFUL MIGHT HAPPEN: MORE THAN HALF THE DAYS
IF YOU CHECKED OFF ANY PROBLEMS ON THIS QUESTIONNAIRE, HOW DIFFICULT HAVE THESE PROBLEMS MADE IT FOR YOU TO DO YOUR WORK, TAKE CARE OF THINGS AT HOME, OR GET ALONG WITH OTHER PEOPLE: SOMEWHAT DIFFICULT
5. BEING SO RESTLESS THAT IT IS HARD TO SIT STILL: NOT AT ALL
3. WORRYING TOO MUCH ABOUT DIFFERENT THINGS: SEVERAL DAYS
1. FEELING NERVOUS, ANXIOUS, OR ON EDGE: MORE THAN HALF THE DAYS
6. BECOMING EASILY ANNOYED OR IRRITABLE: NOT AT ALL

## 2022-05-26 ASSESSMENT — PATIENT HEALTH QUESTIONNAIRE - PHQ9
CLINICAL INTERPRETATION OF PHQ2 SCORE: 4
5. POOR APPETITE OR OVEREATING: 2 - MORE THAN HALF THE DAYS
SUM OF ALL RESPONSES TO PHQ QUESTIONS 1-9: 14

## 2022-05-26 NOTE — PROGRESS NOTES
Name: Cheyenne Kelley Date: [birth year]  [birth year] Time in session 60 minutes   [] Initial Dx Eval [] Family [] Cancelled/Rescheduled [] Office visit   [x] Individual [] Group [] Late Cancellation [] Virtual Session   [] Couple [] Testing [] No call/No show [] Late   [] Discharge [] Phone [x] On time: 10:00 AM []  (4)   Attended: Ms. Kelley (She wore a mask)    Observations/ Appearance/ Affect: Ms. Kelley appeared clean, well-groomed, and dressed in warm clothes. She was oriented to person, place, time and purpose, was pleasant and cooperative, lucid and articulate.  Her affect was anxious and mood was sad. No suicidal or homicidal ideation described or reported. No reports of hallucinations or confusions. She participated well in this session.   Content/ Topics: Ms. Kelley reported that she had a lot to tell this psychologist. She reported that she talked with her son and that they were not going through with the wedding. She reported that they were a couple but were not ready to get . She reported that she reassured her son that when they were ready they would get . She reported that she went down to [facility] and visited with her younger brother. She reported that he showed them around town and that she had fun. She reported that her brother wanted her to move to [place]. She reported that he also gave her some of their mother’s ashes. She reported that they she had a vile and a few more so she could give her children each some of their grandmother’s ashes. She reported that she thought about her mother this week and how she was happy that she had a chance to be with her mother before she  so they could connect and make up and feel safe with each other again. She reported that when she and her  got back from [place] that had an argument. She reported that he was talking with another woman on his phone and was somewhat secretive about it. She reported that this made it difficult for  her to trust him. She reported that she was sad about this. She reported that this was the day that Dante . Dante  on May 27th and his birthday is on . She reported that she was usually very sad and avoidant around this time but this week she has been okay. She reported that she was not as sad as she had been in the past. She reported that she was aware that the job promotion was still being processed and that there were other co-workers who had seniority and would probably also be offered the position. She reported that she was okay with that and would take a wait and see attitude. Discussed how the work here has been focused on the impact sudden loss had on a person and a family. Discussed the sudden loss of Dante last session. Discussed how the threat from loss is from something we cannot see and we feel powerless and helpless. Sudden loss interrupts the process of connection and compromises our ability to engage with others by replacing patterns of connection with patterns of protection. Our everyday routine is gone and there is no sense of normalcy. We do not know when this well end. When the dorsal vagus comes to the rescue, there is not enough energy to run the system. The system is drained and the person is numbed.  In a sympathetic nervous system response, there is too much energy in the system and the client is flooded. And in a ventral vagal state the system is regulated, open to connection, and the client is ready to engage. Discussed how she is processing the loss and re-patterning. Discussed how she is identifying and expressing feelings and re-pattering to living without the Dante and her mother. Discussed how there is meaning to find in loss. Discussed how here at [facility], there is time to grieve and interpret normal behavior for her and support her individuality.    Risk issues assessed: Ms. Kelley reported that her current relationship was somewhat better this week. Discussed how her  relationship with her son was improving. (PHQ-9 = 14, SAMIR-7 = 7). She reported that she would not harm herself and did not intend to harm herself or another.   Psychosocial and environmental problems addressed: Discussed with Ms. Kelley how difficult it is to process sudden loss. Discussed how processing this complicated grief and this supportive psychotherapy will focus on her health and well-being and support her grieving. She reported that she was using the Guided Imagery to help her sleep.    Current GAF: 55   Current medications: None.   Significant/ Recent Events: Discusses strategies to manage and decrease anxieties and sadness. Discussed using Mindful Self-Compassion Skills to calm and sooth herself when she is experienced a painful moment of loss. Reminded her that a person can find meaning from loss. Discussed using Guided Imagery to find a place in her mind where she can go to feel safe and relaxed. Discussed how she can use her images of the beach and of other places, real or imagined, in her mind to sooth herself and feel 100% safe there.  Discussed how she might talk with her  about a marital therapy. Discussed the benefits of marital therapy.  Discussed how this psychotherapy would be informed by Polyvagal Theory, Positive Psychology (Mindful Self-Compassion), and how she will develop Cognitive Behavioral Skills and Strategies to process her losses, decrease her anxieties and fears and gain control over her life, develop a healthier lifestyle, increase restful sleep and find meaning and balance in her life. Discussed the work she had done in these sessions. Discussed how in Polyvagal Theory the skill of savoring is to bring active awareness to moments of connection and safety help to interrupt the pattern of negativity and support positive change: savoring the state of connection and savor the experience of safety. Discussed how she is re-patterning her life without her mother and Dante.  Discussed how she is finding meaning in her relationships with her family and friends and was feeling better about herself.    Informed consent issues discussed: Ms. Kelley she expressed her desire to change and was willing to start the process. She reported that she was willing to make changes to her life toward a healthy lifestyle. She reported that she was aware of the problems she experienced and expressed the desire to solve those problems safely. She reported that she had a positive outlook for change. She reported that she had family and friends that were supportive and knew she needed to gain and maintain a healthy network of support.    Assignments/ Homework: Ms. Kelley agreed to initiate some of the strategies discussed today to increase restful sleep and decrease anxieties.    Plan: Ms. Kelley agreed to closely monitor her mood and behavior.    Diagnoses: F43.21 Complicated Bereavement [] Provisional   Treatment Plan: [x] Continued [] New [] Replaced Referral:   Suicidal [] Yes [x] No [] Medical:    Violence: [] Yes [x] No [] Psychiatric:    Next session:     [] Neurological:          Lamont Rachel Psy.D.  Clinical Psychologist  [facility]  NPI: [id number]

## 2022-06-08 RX ORDER — ACETAMINOPHEN 500 MG
1-2 TABLET ORAL EVERY 6 HOURS PRN
Status: ON HOLD | COMMUNITY
Start: 2021-04-15 | End: 2022-12-13

## 2022-06-08 RX ORDER — IBUPROFEN 600 MG/1
600 TABLET ORAL
COMMUNITY
Start: 2021-04-15 | End: 2022-12-06

## 2022-06-09 ENCOUNTER — HOSPITAL ENCOUNTER (OUTPATIENT)
Facility: MEDICAL CENTER | Age: 57
End: 2022-06-09
Attending: STUDENT IN AN ORGANIZED HEALTH CARE EDUCATION/TRAINING PROGRAM
Payer: COMMERCIAL

## 2022-06-09 PROCEDURE — 81001 URINALYSIS AUTO W/SCOPE: CPT

## 2022-06-10 LAB
APPEARANCE UR: CLEAR
BACTERIA #/AREA URNS HPF: NEGATIVE /HPF
BILIRUB UR QL STRIP.AUTO: NEGATIVE
COLOR UR: YELLOW
EPI CELLS #/AREA URNS HPF: NEGATIVE /HPF
GLUCOSE UR STRIP.AUTO-MCNC: NEGATIVE MG/DL
HYALINE CASTS #/AREA URNS LPF: NORMAL /LPF
KETONES UR STRIP.AUTO-MCNC: NEGATIVE MG/DL
LEUKOCYTE ESTERASE UR QL STRIP.AUTO: NEGATIVE
MICRO URNS: ABNORMAL
NITRITE UR QL STRIP.AUTO: NEGATIVE
PH UR STRIP.AUTO: 6.5 [PH] (ref 5–8)
PROT UR QL STRIP: NEGATIVE MG/DL
RBC # URNS HPF: NORMAL /HPF
RBC UR QL AUTO: ABNORMAL
SP GR UR STRIP.AUTO: 1.01
UROBILINOGEN UR STRIP.AUTO-MCNC: 0.2 MG/DL
WBC #/AREA URNS HPF: NORMAL /HPF

## 2022-11-09 ENCOUNTER — APPOINTMENT (OUTPATIENT)
Dept: RADIOLOGY | Facility: MEDICAL CENTER | Age: 57
End: 2022-11-09
Attending: EMERGENCY MEDICINE
Payer: COMMERCIAL

## 2022-11-09 ENCOUNTER — HOSPITAL ENCOUNTER (EMERGENCY)
Facility: MEDICAL CENTER | Age: 57
End: 2022-11-09
Attending: EMERGENCY MEDICINE
Payer: COMMERCIAL

## 2022-11-09 VITALS
WEIGHT: 238 LBS | DIASTOLIC BLOOD PRESSURE: 92 MMHG | HEART RATE: 75 BPM | SYSTOLIC BLOOD PRESSURE: 156 MMHG | HEIGHT: 66 IN | TEMPERATURE: 98.2 F | RESPIRATION RATE: 16 BRPM | OXYGEN SATURATION: 95 % | BODY MASS INDEX: 38.25 KG/M2

## 2022-11-09 DIAGNOSIS — R10.9 FLANK PAIN: ICD-10-CM

## 2022-11-09 DIAGNOSIS — K40.90 NON-RECURRENT UNILATERAL INGUINAL HERNIA WITHOUT OBSTRUCTION OR GANGRENE: ICD-10-CM

## 2022-11-09 DIAGNOSIS — N20.0 KIDNEY STONE: ICD-10-CM

## 2022-11-09 LAB
ALBUMIN SERPL BCP-MCNC: 4.4 G/DL (ref 3.2–4.9)
ALBUMIN/GLOB SERPL: 1.6 G/DL
ALP SERPL-CCNC: 92 U/L (ref 30–99)
ALT SERPL-CCNC: 20 U/L (ref 2–50)
ANION GAP SERPL CALC-SCNC: 10 MMOL/L (ref 7–16)
APPEARANCE UR: CLEAR
AST SERPL-CCNC: 16 U/L (ref 12–45)
BACTERIA #/AREA URNS HPF: NEGATIVE /HPF
BASOPHILS # BLD AUTO: 0.7 % (ref 0–1.8)
BASOPHILS # BLD: 0.07 K/UL (ref 0–0.12)
BILIRUB SERPL-MCNC: 0.2 MG/DL (ref 0.1–1.5)
BILIRUB UR QL STRIP.AUTO: NEGATIVE
BUN SERPL-MCNC: 15 MG/DL (ref 8–22)
CALCIUM SERPL-MCNC: 9.4 MG/DL (ref 8.5–10.5)
CHLORIDE SERPL-SCNC: 105 MMOL/L (ref 96–112)
CO2 SERPL-SCNC: 27 MMOL/L (ref 20–33)
COLOR UR: YELLOW
CREAT SERPL-MCNC: 0.85 MG/DL (ref 0.5–1.4)
EOSINOPHIL # BLD AUTO: 0.43 K/UL (ref 0–0.51)
EOSINOPHIL NFR BLD: 4.2 % (ref 0–6.9)
EPI CELLS #/AREA URNS HPF: NEGATIVE /HPF
ERYTHROCYTE [DISTWIDTH] IN BLOOD BY AUTOMATED COUNT: 48.9 FL (ref 35.9–50)
GFR SERPLBLD CREATININE-BSD FMLA CKD-EPI: 81 ML/MIN/1.73 M 2
GLOBULIN SER CALC-MCNC: 2.7 G/DL (ref 1.9–3.5)
GLUCOSE SERPL-MCNC: 95 MG/DL (ref 65–99)
GLUCOSE UR STRIP.AUTO-MCNC: NEGATIVE MG/DL
HCT VFR BLD AUTO: 48.5 % (ref 37–47)
HGB BLD-MCNC: 15.7 G/DL (ref 12–16)
HYALINE CASTS #/AREA URNS LPF: ABNORMAL /LPF
IMM GRANULOCYTES # BLD AUTO: 0.05 K/UL (ref 0–0.11)
IMM GRANULOCYTES NFR BLD AUTO: 0.5 % (ref 0–0.9)
KETONES UR STRIP.AUTO-MCNC: NEGATIVE MG/DL
LEUKOCYTE ESTERASE UR QL STRIP.AUTO: NEGATIVE
LIPASE SERPL-CCNC: 44 U/L (ref 11–82)
LYMPHOCYTES # BLD AUTO: 4.21 K/UL (ref 1–4.8)
LYMPHOCYTES NFR BLD: 40.9 % (ref 22–41)
MCH RBC QN AUTO: 31.2 PG (ref 27–33)
MCHC RBC AUTO-ENTMCNC: 32.4 G/DL (ref 33.6–35)
MCV RBC AUTO: 96.4 FL (ref 81.4–97.8)
MICRO URNS: ABNORMAL
MONOCYTES # BLD AUTO: 0.54 K/UL (ref 0–0.85)
MONOCYTES NFR BLD AUTO: 5.2 % (ref 0–13.4)
NEUTROPHILS # BLD AUTO: 4.99 K/UL (ref 2–7.15)
NEUTROPHILS NFR BLD: 48.5 % (ref 44–72)
NITRITE UR QL STRIP.AUTO: NEGATIVE
NRBC # BLD AUTO: 0 K/UL
NRBC BLD-RTO: 0 /100 WBC
PH UR STRIP.AUTO: 5.5 [PH] (ref 5–8)
PLATELET # BLD AUTO: 268 K/UL (ref 164–446)
PMV BLD AUTO: 9.9 FL (ref 9–12.9)
POTASSIUM SERPL-SCNC: 4 MMOL/L (ref 3.6–5.5)
PROT SERPL-MCNC: 7.1 G/DL (ref 6–8.2)
PROT UR QL STRIP: NEGATIVE MG/DL
RBC # BLD AUTO: 5.03 M/UL (ref 4.2–5.4)
RBC # URNS HPF: ABNORMAL /HPF
RBC UR QL AUTO: ABNORMAL
SODIUM SERPL-SCNC: 142 MMOL/L (ref 135–145)
SP GR UR STRIP.AUTO: 1.02
UROBILINOGEN UR STRIP.AUTO-MCNC: 0.2 MG/DL
WBC # BLD AUTO: 10.3 K/UL (ref 4.8–10.8)
WBC #/AREA URNS HPF: ABNORMAL /HPF

## 2022-11-09 PROCEDURE — 700102 HCHG RX REV CODE 250 W/ 637 OVERRIDE(OP): Performed by: EMERGENCY MEDICINE

## 2022-11-09 PROCEDURE — 80053 COMPREHEN METABOLIC PANEL: CPT

## 2022-11-09 PROCEDURE — 85025 COMPLETE CBC W/AUTO DIFF WBC: CPT

## 2022-11-09 PROCEDURE — A9270 NON-COVERED ITEM OR SERVICE: HCPCS | Performed by: EMERGENCY MEDICINE

## 2022-11-09 PROCEDURE — 36415 COLL VENOUS BLD VENIPUNCTURE: CPT

## 2022-11-09 PROCEDURE — 81001 URINALYSIS AUTO W/SCOPE: CPT

## 2022-11-09 PROCEDURE — 99284 EMERGENCY DEPT VISIT MOD MDM: CPT

## 2022-11-09 PROCEDURE — 74176 CT ABD & PELVIS W/O CONTRAST: CPT

## 2022-11-09 PROCEDURE — 83690 ASSAY OF LIPASE: CPT

## 2022-11-09 RX ORDER — TAMSULOSIN HYDROCHLORIDE 0.4 MG/1
0.4 CAPSULE ORAL DAILY
Qty: 10 CAPSULE | Refills: 0 | Status: SHIPPED | OUTPATIENT
Start: 2022-11-09 | End: 2022-11-19

## 2022-11-09 RX ORDER — HYDROCODONE BITARTRATE AND ACETAMINOPHEN 5; 325 MG/1; MG/1
1 TABLET ORAL ONCE
Status: COMPLETED | OUTPATIENT
Start: 2022-11-09 | End: 2022-11-09

## 2022-11-09 RX ORDER — ONDANSETRON 4 MG/1
4 TABLET, ORALLY DISINTEGRATING ORAL EVERY 6 HOURS PRN
Qty: 10 TABLET | Refills: 0 | Status: SHIPPED | OUTPATIENT
Start: 2022-11-09

## 2022-11-09 RX ORDER — NAPROXEN 500 MG/1
500 TABLET ORAL 2 TIMES DAILY WITH MEALS
Qty: 60 TABLET | Refills: 0 | Status: SHIPPED | OUTPATIENT
Start: 2022-11-09

## 2022-11-09 RX ADMIN — HYDROCODONE BITARTRATE AND ACETAMINOPHEN 1 TABLET: 5; 325 TABLET ORAL at 18:02

## 2022-11-09 ASSESSMENT — FIBROSIS 4 INDEX: FIB4 SCORE: 0.74

## 2022-11-09 NOTE — Clinical Note
Cheyenne Kelley was seen and treated in our emergency department on 11/9/2022.  She may return to work on 11/14/2022.       If you have any questions or concerns, please don't hesitate to call.      Bladimir Villafuerte D.O.

## 2022-11-10 NOTE — DISCHARGE INSTRUCTIONS
At this point you do not have evidence of acute intra-abdominal abnormality such as cholecystitis, appendicitis, diverticulitis yet it is very possible that your condition may evolve into an infection that may need surgical intervention or reevaluation.  For this reason, return to the emergency department in 24 hours if you have continuation of pain or return sooner for increasing pain.

## 2022-11-10 NOTE — ED NOTES
"Pt discharged home with family. The pt is alert and oriented, calm and cooperative, talks with clear coherent speech, ambulates with a steady gait, and moves extremities to dress self. The pt reports pain. No grimacing, gaurding, moaning, or other non-verbal indicators of pain noted. Vitals stable on room air. pt in possession of belongings. Pt provided discharge education and information pertaining to medications and follow up appointments. Pt received copy of discharge instructions and verbalized understanding. BP (!) 156/92   Pulse 75   Temp 36.8 °C (98.2 °F) (Temporal)   Resp 16   Ht 1.676 m (5' 6\")   Wt 108 kg (238 lb)   SpO2 95%   BMI 38.41 kg/m²     "

## 2022-11-10 NOTE — ED PROVIDER NOTES
ED Provider Note    CHIEF COMPLAINT  Chief Complaint   Patient presents with    Pelvic Pain     LT sided pelvic pain x1.5 weeks. Denies vaginal bleeding. Radiated up into LT flank. Reports hx of kidney stones and this feels similar.     Flank Pain       HPI  Cheyenne Kelley is a 57 y.o. female who presents to the emergency department complaint of left-sided pelvic pain and left-sided flank pain.  She denies vaginal bleeding vaginal discharge.  She also complains of slight dysuria.  She does have a history of kidney stones in the past and states this feels exactly like a kidney stone.  She had no nausea, no vomiting, pain increases with movement decreased with rest without radiation to her chest or to her groin but does have pain in the left inguinal region.    REVIEW OF SYSTEMS  Positives as above. Pertinent negatives include hematochezia, melena, hematemesis, dysuria, hematuria, nausea, vomiting  All other 10 review of systems are negative    PAST MEDICAL HISTORY  Kidney stones    FAMILY HISTORY  Noncontributory    SOCIAL HISTORY  Social History     Socioeconomic History    Marital status:    Tobacco Use    Smoking status: Every Day     Packs/day: 0.50     Types: Cigarettes    Smokeless tobacco: Never   Vaping Use    Vaping Use: Never used   Substance and Sexual Activity    Alcohol use: Yes     Comment: Occ    Drug use: Never       SURGICAL HISTORY  Past Surgical History:   Procedure Laterality Date    CYSTOSCOPY STENT PLACEMENT      This was probably removed but this was due to a kidney stone       CURRENT MEDICATIONS  Home Medications       Reviewed by López Rausch R.N. (Registered Nurse) on 11/09/22 at 1626  Med List Status: Partial     Medication Last Dose Status   acetaminophen (PHARBETOL EXTRA STRENGTH) 500 MG Tab  Active   ibuprofen (MOTRIN) 600 MG Tab  Active                    ALLERGIES  No Known Allergies    PHYSICAL EXAM  VITAL SIGNS: BP (!) 156/92   Pulse 75   Temp 36.8 °C (98.2 °F) (Temporal)   " Resp 16   Ht 1.676 m (5' 6\")   Wt 108 kg (238 lb)   SpO2 95%   BMI 38.41 kg/m²      Constitutional: Well developed, Well nourished, No acute distress, Non-toxic appearance.   Eyes: PERRLA, EOMI, Conjunctiva normal, No discharge.   Cardiovascular: Normal heart rate, Normal rhythm, No murmurs, No rubs, No gallops, and intact distal pulses.   Thorax & Lungs:  No respiratory distress, no rales, no rhonchi, No wheezing, No chest wall tenderness.   Abdomen: Protuberant, bowel sounds normal, Soft, slight left inguinal and lower quadrant tenderness, no guarding, no rebound negative Guerrero sign, no McBurney point tenderness  Musculoskeletal: No CVA tenderness.   Skin: Warm, Dry, No erythema, No rash.   Extremities: Full range of motion, no deformity, no edema.  Neurologic: Alert & oriented x 3, No focal deficits noted, acting appropriately on exam.  Psychiatric: Affect normal for clinical presentation.      LABORATORY/ECG  Results for orders placed or performed during the hospital encounter of 11/09/22   Urinalysis, Culture if Indicated    Specimen: Urine   Result Value Ref Range    Color Yellow     Character Clear     Specific Gravity 1.017 <1.035    Ph 5.5 5.0 - 8.0    Glucose Negative Negative mg/dL    Ketones Negative Negative mg/dL    Protein Negative Negative mg/dL    Bilirubin Negative Negative    Urobilinogen, Urine 0.2 Negative    Nitrite Negative Negative    Leukocyte Esterase Negative Negative    Occult Blood Small (A) Negative    Micro Urine Req Microscopic    CBC with Differential   Result Value Ref Range    WBC 10.3 4.8 - 10.8 K/uL    RBC 5.03 4.20 - 5.40 M/uL    Hemoglobin 15.7 12.0 - 16.0 g/dL    Hematocrit 48.5 (H) 37.0 - 47.0 %    MCV 96.4 81.4 - 97.8 fL    MCH 31.2 27.0 - 33.0 pg    MCHC 32.4 (L) 33.6 - 35.0 g/dL    RDW 48.9 35.9 - 50.0 fL    Platelet Count 268 164 - 446 K/uL    MPV 9.9 9.0 - 12.9 fL    Neutrophils-Polys 48.50 44.00 - 72.00 %    Lymphocytes 40.90 22.00 - 41.00 %    Monocytes 5.20 " 0.00 - 13.40 %    Eosinophils 4.20 0.00 - 6.90 %    Basophils 0.70 0.00 - 1.80 %    Immature Granulocytes 0.50 0.00 - 0.90 %    Nucleated RBC 0.00 /100 WBC    Neutrophils (Absolute) 4.99 2.00 - 7.15 K/uL    Lymphs (Absolute) 4.21 1.00 - 4.80 K/uL    Monos (Absolute) 0.54 0.00 - 0.85 K/uL    Eos (Absolute) 0.43 0.00 - 0.51 K/uL    Baso (Absolute) 0.07 0.00 - 0.12 K/uL    Immature Granulocytes (abs) 0.05 0.00 - 0.11 K/uL    NRBC (Absolute) 0.00 K/uL   Complete Metabolic Panel   Result Value Ref Range    Sodium 142 135 - 145 mmol/L    Potassium 4.0 3.6 - 5.5 mmol/L    Chloride 105 96 - 112 mmol/L    Co2 27 20 - 33 mmol/L    Anion Gap 10.0 7.0 - 16.0    Glucose 95 65 - 99 mg/dL    Bun 15 8 - 22 mg/dL    Creatinine 0.85 0.50 - 1.40 mg/dL    Calcium 9.4 8.5 - 10.5 mg/dL    AST(SGOT) 16 12 - 45 U/L    ALT(SGPT) 20 2 - 50 U/L    Alkaline Phosphatase 92 30 - 99 U/L    Total Bilirubin 0.2 0.1 - 1.5 mg/dL    Albumin 4.4 3.2 - 4.9 g/dL    Total Protein 7.1 6.0 - 8.2 g/dL    Globulin 2.7 1.9 - 3.5 g/dL    A-G Ratio 1.6 g/dL   Lipase   Result Value Ref Range    Lipase 44 11 - 82 U/L   URINE MICROSCOPIC (W/UA)   Result Value Ref Range    WBC 0-2 /hpf    RBC 5-10 (A) /hpf    Bacteria Negative None /hpf    Epithelial Cells Negative /hpf    Hyaline Cast 0-2 /lpf   ESTIMATED GFR   Result Value Ref Range    GFR (CKD-EPI) 81 >60 mL/min/1.73 m 2         RADIOLOGY/PROCEDURES    CT-RENAL COLIC EVALUATION(A/P W/O)   Final Result      Cholelithiasis without evidence of acute cholecystitis or biliary dilatation.      No urolithiasis or hydronephrosis      Hysterectomy          COURSE & MEDICAL DECISION MAKING  Pertinent Labs & Imaging studies reviewed. (See chart for details)  This is a charming 57-year-old female presents with left lower pelvic pain, left flank pain.  Is concern for possible kidney stone therefore CT scan was completed.  CT scan was negative for acute urolithiasis, hydronephrosis but she does have a urinalysis that has blood  and concerned she might of passed the stone.  In addition, she does not have evidence of an infectious etiology such as diverticulitis, small bowel obstruction, appendicitis or any intra-abdominal abnormality with inflammatory changes seen in CT scan and.  The patient also has a inguinal hernia on the left that does not have fat but no bowel or small bowel.  On reevaluation she is pinpoint that is exactly where the pain is.  It is not unbearable, I do not feel a palpable mass that requires reduction.  Had a long conversation the patient after she received Norco here and she states that she will be following up with the surgeon for possible outpatient evaluation and management of her inguinal hernia.  She could have a 2 component presentation with inguinal hernia as well as past kidney stone have ruminant pain.  She received Flomax and Naprosyn for the pain.  Patient is to return to the emergency department she has increasing fever, nausea, vomiting or abdominal pain.      Although at this point I do not believe the patient has an acute intra-abdominal process that requires emergent surgical intervention there is a possibility that the patient is experiencing an early infection that is in evolution that may require further evaluation and possible surgical consultation. Therefore, strict return precautions have been given to return to the emergency department within 24 hours if the patient has any remaining abdominal pain and to return sooner for increasing pain, uncontrolled nausea or vomiting, fevers or change in symptoms. The patient will followup with their primary care physician within 3 days for re-evaluation.        FINAL IMPRESSION     1. Flank pain Active   2. Kidney stone    3. Non-recurrent unilateral inguinal hernia without obstruction or gangrene        DISPOSITION:  Patient will be discharged home in stable condition.    FOLLOW UP:  [facility], Emergency Dept  [address]  [place] [id number] [phone]    If symptoms worsen    Danae Chery M.D.  [address]  [facility] [address] [phone]    Schedule an appointment as soon as possible for a visit in 1 week  As needed    Darinel Haley M.D.  [address]  [address]  [place] [address] [phone]    Schedule an appointment as soon as possible for a visit         OUTPATIENT MEDICATIONS:  Discharge Medication List as of 11/9/2022  7:20 PM        START taking these medications    Details   tamsulosin (FLOMAX) 0.4 MG capsule Take 1 Capsule by mouth every day for 10 days., Disp-10 Capsule, R-0, Normal      naproxen (NAPROSYN) 500 MG Tab Take 1 Tablet by mouth 2 times a day with meals., Disp-60 Tablet, R-0, Normal      ondansetron (ZOFRAN ODT) 4 MG TABLET DISPERSIBLE Take 1 Tablet by mouth every 6 hours as needed for Nausea., Disp-10 Tablet, R-0, Normal                    Electronically signed by: Bladimir Villafuerte D.O., 11/9/2022 6:12 PM

## 2022-11-11 ENCOUNTER — APPOINTMENT (OUTPATIENT)
Dept: RADIOLOGY | Facility: MEDICAL CENTER | Age: 57
End: 2022-11-11
Attending: EMERGENCY MEDICINE
Payer: COMMERCIAL

## 2022-11-11 ENCOUNTER — HOSPITAL ENCOUNTER (EMERGENCY)
Facility: MEDICAL CENTER | Age: 57
End: 2022-11-12
Attending: EMERGENCY MEDICINE
Payer: COMMERCIAL

## 2022-11-11 DIAGNOSIS — K40.90 UNILATERAL INGUINAL HERNIA WITHOUT OBSTRUCTION OR GANGRENE, RECURRENCE NOT SPECIFIED: ICD-10-CM

## 2022-11-11 LAB
ALBUMIN SERPL BCP-MCNC: 3.9 G/DL (ref 3.2–4.9)
ALBUMIN/GLOB SERPL: 1.6 G/DL
ALP SERPL-CCNC: 83 U/L (ref 30–99)
ALT SERPL-CCNC: 19 U/L (ref 2–50)
ANION GAP SERPL CALC-SCNC: 10 MMOL/L (ref 7–16)
APPEARANCE UR: CLEAR
AST SERPL-CCNC: 17 U/L (ref 12–45)
BACTERIA #/AREA URNS HPF: NEGATIVE /HPF
BASOPHILS # BLD AUTO: 0.8 % (ref 0–1.8)
BASOPHILS # BLD: 0.07 K/UL (ref 0–0.12)
BILIRUB SERPL-MCNC: <0.2 MG/DL (ref 0.1–1.5)
BILIRUB UR QL STRIP.AUTO: NEGATIVE
BUN SERPL-MCNC: 19 MG/DL (ref 8–22)
CALCIUM SERPL-MCNC: 9.2 MG/DL (ref 8.5–10.5)
CHLORIDE SERPL-SCNC: 103 MMOL/L (ref 96–112)
CO2 SERPL-SCNC: 26 MMOL/L (ref 20–33)
COLOR UR: YELLOW
CREAT SERPL-MCNC: 0.83 MG/DL (ref 0.5–1.4)
EOSINOPHIL # BLD AUTO: 0.72 K/UL (ref 0–0.51)
EOSINOPHIL NFR BLD: 7.8 % (ref 0–6.9)
EPI CELLS #/AREA URNS HPF: NEGATIVE /HPF
ERYTHROCYTE [DISTWIDTH] IN BLOOD BY AUTOMATED COUNT: 49.3 FL (ref 35.9–50)
GFR SERPLBLD CREATININE-BSD FMLA CKD-EPI: 83 ML/MIN/1.73 M 2
GLOBULIN SER CALC-MCNC: 2.5 G/DL (ref 1.9–3.5)
GLUCOSE SERPL-MCNC: 125 MG/DL (ref 65–99)
GLUCOSE UR STRIP.AUTO-MCNC: NEGATIVE MG/DL
HCT VFR BLD AUTO: 44.2 % (ref 37–47)
HGB BLD-MCNC: 14.7 G/DL (ref 12–16)
HYALINE CASTS #/AREA URNS LPF: ABNORMAL /LPF
IMM GRANULOCYTES # BLD AUTO: 0.05 K/UL (ref 0–0.11)
IMM GRANULOCYTES NFR BLD AUTO: 0.5 % (ref 0–0.9)
KETONES UR STRIP.AUTO-MCNC: NEGATIVE MG/DL
LEUKOCYTE ESTERASE UR QL STRIP.AUTO: NEGATIVE
LIPASE SERPL-CCNC: 40 U/L (ref 11–82)
LYMPHOCYTES # BLD AUTO: 2.95 K/UL (ref 1–4.8)
LYMPHOCYTES NFR BLD: 31.9 % (ref 22–41)
MCH RBC QN AUTO: 32.2 PG (ref 27–33)
MCHC RBC AUTO-ENTMCNC: 33.3 G/DL (ref 33.6–35)
MCV RBC AUTO: 96.7 FL (ref 81.4–97.8)
MICRO URNS: ABNORMAL
MONOCYTES # BLD AUTO: 0.67 K/UL (ref 0–0.85)
MONOCYTES NFR BLD AUTO: 7.3 % (ref 0–13.4)
NEUTROPHILS # BLD AUTO: 4.78 K/UL (ref 2–7.15)
NEUTROPHILS NFR BLD: 51.7 % (ref 44–72)
NITRITE UR QL STRIP.AUTO: NEGATIVE
NRBC # BLD AUTO: 0 K/UL
NRBC BLD-RTO: 0 /100 WBC
PH UR STRIP.AUTO: 5.5 [PH] (ref 5–8)
PLATELET # BLD AUTO: 230 K/UL (ref 164–446)
PMV BLD AUTO: 10.2 FL (ref 9–12.9)
POTASSIUM SERPL-SCNC: 4 MMOL/L (ref 3.6–5.5)
PROT SERPL-MCNC: 6.4 G/DL (ref 6–8.2)
PROT UR QL STRIP: NEGATIVE MG/DL
RBC # BLD AUTO: 4.57 M/UL (ref 4.2–5.4)
RBC # URNS HPF: ABNORMAL /HPF
RBC UR QL AUTO: ABNORMAL
SODIUM SERPL-SCNC: 139 MMOL/L (ref 135–145)
SP GR UR STRIP.AUTO: 1.02
UROBILINOGEN UR STRIP.AUTO-MCNC: 0.2 MG/DL
WBC # BLD AUTO: 9.2 K/UL (ref 4.8–10.8)
WBC #/AREA URNS HPF: ABNORMAL /HPF

## 2022-11-11 PROCEDURE — 99285 EMERGENCY DEPT VISIT HI MDM: CPT

## 2022-11-11 PROCEDURE — 80053 COMPREHEN METABOLIC PANEL: CPT

## 2022-11-11 PROCEDURE — 96375 TX/PRO/DX INJ NEW DRUG ADDON: CPT

## 2022-11-11 PROCEDURE — 83690 ASSAY OF LIPASE: CPT

## 2022-11-11 PROCEDURE — 76857 US EXAM PELVIC LIMITED: CPT

## 2022-11-11 PROCEDURE — 85025 COMPLETE CBC W/AUTO DIFF WBC: CPT

## 2022-11-11 PROCEDURE — 700111 HCHG RX REV CODE 636 W/ 250 OVERRIDE (IP): Performed by: EMERGENCY MEDICINE

## 2022-11-11 PROCEDURE — 36415 COLL VENOUS BLD VENIPUNCTURE: CPT

## 2022-11-11 PROCEDURE — 96374 THER/PROPH/DIAG INJ IV PUSH: CPT

## 2022-11-11 PROCEDURE — 81001 URINALYSIS AUTO W/SCOPE: CPT

## 2022-11-11 RX ORDER — HYDROMORPHONE HYDROCHLORIDE 1 MG/ML
0.5 INJECTION, SOLUTION INTRAMUSCULAR; INTRAVENOUS; SUBCUTANEOUS ONCE
Status: COMPLETED | OUTPATIENT
Start: 2022-11-11 | End: 2022-11-11

## 2022-11-11 RX ORDER — ONDANSETRON 2 MG/ML
4 INJECTION INTRAMUSCULAR; INTRAVENOUS ONCE
Status: COMPLETED | OUTPATIENT
Start: 2022-11-11 | End: 2022-11-11

## 2022-11-11 RX ORDER — KETOROLAC TROMETHAMINE 30 MG/ML
30 INJECTION, SOLUTION INTRAMUSCULAR; INTRAVENOUS ONCE
Status: COMPLETED | OUTPATIENT
Start: 2022-11-11 | End: 2022-11-11

## 2022-11-11 RX ADMIN — ONDANSETRON 4 MG: 2 INJECTION INTRAMUSCULAR; INTRAVENOUS at 22:19

## 2022-11-11 RX ADMIN — KETOROLAC TROMETHAMINE 30 MG: 30 INJECTION, SOLUTION INTRAMUSCULAR at 22:23

## 2022-11-11 RX ADMIN — HYDROMORPHONE HYDROCHLORIDE 0.5 MG: 1 INJECTION, SOLUTION INTRAMUSCULAR; INTRAVENOUS; SUBCUTANEOUS at 22:19

## 2022-11-11 ASSESSMENT — FIBROSIS 4 INDEX: FIB4 SCORE: 0.73

## 2022-11-12 VITALS
RESPIRATION RATE: 16 BRPM | DIASTOLIC BLOOD PRESSURE: 75 MMHG | BODY MASS INDEX: 38.97 KG/M2 | SYSTOLIC BLOOD PRESSURE: 133 MMHG | HEIGHT: 66 IN | HEART RATE: 91 BPM | WEIGHT: 242.51 LBS | TEMPERATURE: 97 F | OXYGEN SATURATION: 93 %

## 2022-11-12 RX ORDER — OXYCODONE HYDROCHLORIDE AND ACETAMINOPHEN 5; 325 MG/1; MG/1
1-2 TABLET ORAL EVERY 6 HOURS PRN
Qty: 12 TABLET | Refills: 0 | Status: SHIPPED | OUTPATIENT
Start: 2022-11-12 | End: 2022-11-15

## 2022-11-12 NOTE — ED TRIAGE NOTES
"Chief Complaint   Patient presents with    Hernia     Here last night for same cc informed to return to ER if pain remains severe, L side pelvic sharp/constant 8/10 pain    Flank Pain     +x2 kidney stones, one passed yesterday, unknown status of other         Ambulated to triage with partner for above complaint. Dx yesterday with flank pain, kidney stone, unilateral inguinal hernia with f/u with PCP or return to ER in 24hrs for increasing pain.    ABD protocols ordered. Pt brought to [facility] office for blood draw. UA given. Pt educated of triage process and informed to contact staff if situation changes.    BP (!) 178/106   Pulse 97   Temp 36.1 °C (97 °F) (Temporal)   Resp 18   Ht 1.676 m (5' 6\")   Wt 110 kg (242 lb 8.1 oz)   SpO2 91% Comment: Room air  BMI 39.14 kg/m²      "

## 2022-11-12 NOTE — ED PROVIDER NOTES
ED Provider Note    CHIEF COMPLAINT  Chief Complaint   Patient presents with    Hernia     Here last night for same cc informed to return to ER if pain remains severe, L side pelvic sharp/constant 8/10 pain    Flank Pain     +x2 kidney stones, one passed yesterday, unknown status of other        HPI  Cheyenne Kelley is a 57 y.o. female who presents with a chief complaint of abdominal pain.  Left inguinal area.  Duration has been for about 40 days.  Got worse the last 24 hours.  Is better when she is laying down is worse when she is standing up and walking.  Is in inguinal area rating up to the left flank area.  No associated dysuria urgency or frequency.  She is helped a little bit of narcotic pain medicine that her  had.  In the message got it from here did not help    Patient was seen here yesterday had a CT scan.  She had blood in her urine so there is believe that she probably passed a kidney stone but there is also noted a left inguinal hernia.  Try to make an appointment.  She was seen by  By the next few weeks.    REVIEW OF SYSTEMS  General: No fever or chills.  Eyes: No eye discharge. No eye pain.  Ear nose throat: No sore throat or  trouble swallowing.  Pulmonary: No shortness of breath or cough.  Cardiovascular: No chest pain or chest pressure.  GI: See above  : No dysuria or hematuria  Dermatologic: No rashes. No abrasions.  Neurologic: No weakness or numbness.      All other systems are negative      PAST MEDICAL HISTORY  History reviewed. No pertinent past medical history.    FAMILY HISTORY  Family History   Problem Relation Age of Onset    Breast Cancer Mother     Breast Cancer Maternal Grandmother     Diabetes Paternal Grandmother        SOCIAL HISTORY  Social History     Socioeconomic History    Marital status:    Tobacco Use    Smoking status: Every Day     Packs/day: 0.50     Types: Cigarettes    Smokeless tobacco: Never   Vaping Use    Vaping Use: Never used   Substance and Sexual  "Activity    Alcohol use: Yes     Comment: Occ/soc    Drug use: Never       SURGICAL HISTORY  Past Surgical History:   Procedure Laterality Date    CYSTOSCOPY STENT PLACEMENT      This was probably removed but this was due to a kidney stone       CURRENT MEDICATIONS  Home Medications       Reviewed by Chery Mann R.N. (Registered Nurse) on 11/11/22 at 2023  Med List Status: Partial     Medication Last Dose Status   acetaminophen (PHARBETOL EXTRA STRENGTH) 500 MG Tab  Active   ibuprofen (MOTRIN) 600 MG Tab  Active   naproxen (NAPROSYN) 500 MG Tab  Active   ondansetron (ZOFRAN ODT) 4 MG TABLET DISPERSIBLE  Active   tamsulosin (FLOMAX) 0.4 MG capsule  Active                     ALLERGIES  No Known Allergies    PHYSICAL EXAM  VITAL SIGNS: BP (!) 167/88   Pulse 75   Temp 36.1 °C (97 °F) (Temporal)   Resp 18   Ht 1.676 m (5' 6\")   Wt 110 kg (242 lb 8.1 oz)   SpO2 93%   BMI 39.14 kg/m²   Constitutional: Well developed, Well nourished, No acute distress, Non-toxic appearance.   HENT: Normocephalic, Atraumatic, Bilateral external ears normal, Oropharynx moist, No oral exudates, Nose normal.   Eyes: PERRLA, EOMI, Conjunctiva normal, No discharge.   Musculoskeletal: Neck has normal range of motion, No tenderness, Supple.   Lymphatic: No cervical lymphadenopathy noted.   Cardiovascular: Normal heart rate, Normal rhythm, No murmurs, No rubs, No gallops.   Thorax & Lungs: Normal breath sounds, No respiratory distress, No wheezing, No chest tenderness.   Abdomen: Nondistended nontender soft rebound or guarding she has significant tenderness in the left inguinal area.  No palpable masses noted.  No discharge noted.  Skin: Warm, Dry, No erythema, No rash.   : No CVA tenderness.   Psychiatric: Calm, not anxious  Neurologic: Alert & oriented, moves all extremities equally    RADIOLOGY/PROCEDURES  US-INGUINAL HERNIA   Final Result      Reducible direct fat-containing left inguinal hernia.        Results for orders placed " or performed during the hospital encounter of 11/11/22   CBC with Differential   Result Value Ref Range    WBC 9.2 4.8 - 10.8 K/uL    RBC 4.57 4.20 - 5.40 M/uL    Hemoglobin 14.7 12.0 - 16.0 g/dL    Hematocrit 44.2 37.0 - 47.0 %    MCV 96.7 81.4 - 97.8 fL    MCH 32.2 27.0 - 33.0 pg    MCHC 33.3 (L) 33.6 - 35.0 g/dL    RDW 49.3 35.9 - 50.0 fL    Platelet Count 230 164 - 446 K/uL    MPV 10.2 9.0 - 12.9 fL    Neutrophils-Polys 51.70 44.00 - 72.00 %    Lymphocytes 31.90 22.00 - 41.00 %    Monocytes 7.30 0.00 - 13.40 %    Eosinophils 7.80 (H) 0.00 - 6.90 %    Basophils 0.80 0.00 - 1.80 %    Immature Granulocytes 0.50 0.00 - 0.90 %    Nucleated RBC 0.00 /100 WBC    Neutrophils (Absolute) 4.78 2.00 - 7.15 K/uL    Lymphs (Absolute) 2.95 1.00 - 4.80 K/uL    Monos (Absolute) 0.67 0.00 - 0.85 K/uL    Eos (Absolute) 0.72 (H) 0.00 - 0.51 K/uL    Baso (Absolute) 0.07 0.00 - 0.12 K/uL    Immature Granulocytes (abs) 0.05 0.00 - 0.11 K/uL    NRBC (Absolute) 0.00 K/uL   Complete Metabolic Panel   Result Value Ref Range    Sodium 139 135 - 145 mmol/L    Potassium 4.0 3.6 - 5.5 mmol/L    Chloride 103 96 - 112 mmol/L    Co2 26 20 - 33 mmol/L    Anion Gap 10.0 7.0 - 16.0    Glucose 125 (H) 65 - 99 mg/dL    Bun 19 8 - 22 mg/dL    Creatinine 0.83 0.50 - 1.40 mg/dL    Calcium 9.2 8.5 - 10.5 mg/dL    AST(SGOT) 17 12 - 45 U/L    ALT(SGPT) 19 2 - 50 U/L    Alkaline Phosphatase 83 30 - 99 U/L    Total Bilirubin <0.2 0.1 - 1.5 mg/dL    Albumin 3.9 3.2 - 4.9 g/dL    Total Protein 6.4 6.0 - 8.2 g/dL    Globulin 2.5 1.9 - 3.5 g/dL    A-G Ratio 1.6 g/dL   Lipase   Result Value Ref Range    Lipase 40 11 - 82 U/L   Urinalysis    Specimen: Urine   Result Value Ref Range    Color Yellow     Character Clear     Specific Gravity 1.020 <1.035    Ph 5.5 5.0 - 8.0    Glucose Negative Negative mg/dL    Ketones Negative Negative mg/dL    Protein Negative Negative mg/dL    Bilirubin Negative Negative    Urobilinogen, Urine 0.2 Negative    Nitrite Negative  Negative    Leukocyte Esterase Negative Negative    Occult Blood Small (A) Negative    Micro Urine Req Microscopic    ESTIMATED GFR   Result Value Ref Range    GFR (CKD-EPI) 83 >60 mL/min/1.73 m 2   URINE MICROSCOPIC (W/UA)   Result Value Ref Range    WBC 0-2 /hpf    RBC 5-10 (A) /hpf    Bacteria Negative None /hpf    Epithelial Cells Negative /hpf    Hyaline Cast 0-2 /lpf        COURSE & MEDICAL DECISION MAKING  Pertinent Labs & Imaging studies reviewed. (See chart for details)  Patient here with what appears to be left inguinal pain.  Patient has a CT scan that showed possible fat hernia.  At this point patient has no signs of any bulging hernia that I can tell but she is obese.  Cannot rule out incarcerated hernia or intestinal tract and hernia    Plan  Ultrasound of inguinal area  IV  Dilaudid  Toradol  My suspicions the patient was likely has a fat hernia that is necrosing.  I spoke to have some pain for some time but wanted make sure there is no surgical emergency    12:30 AM  Patient looks well nontoxic.  At this point ultrasound revealed hernia that.  To be reduced she is feeling much better she has appointment with surgeon.  Talked about need for surgery.  This point told to come back for increasing pain vomiting diarrhea lying down putting ice pack on it.  Also taking pain medicine.  We will going upgrade with some Percocet as a feel that she most likely would not have come in if she had better pain management.  Told return for increasing pain fever vomiting    Narcotic search done.  Prescription drug monitoring done.  Patient deemed low risk.  3-day supply.  Told not to drink or drive while taking it.    FINAL IMPRESSION  1.  Left inguinal hernia reducible  3.      Electronically signed by: Arnaud Downs M.D., 11/11/2022 10:08 PM

## 2022-11-12 NOTE — ED NOTES
Pt discharged, all appropriate hospital equipment removed (IV, monitor, pulse ox, etc.). Pt left unit via wc with partner to vehicle for home. Personal belongings with pt when leaving unit. Pt given discharge instructions prior to leaving unit including where to  prescriptions and when to follow-up; verbalizes understanding. Pt informed to return to ED if symptoms worsen/return or altered status develop. Copy of discharge instructions signed and turned into DC basket and copy sent with pt. Controlled substance sheet signed

## 2022-11-18 ENCOUNTER — HOSPITAL ENCOUNTER (EMERGENCY)
Facility: MEDICAL CENTER | Age: 57
End: 2022-11-18
Attending: EMERGENCY MEDICINE
Payer: COMMERCIAL

## 2022-11-18 ENCOUNTER — APPOINTMENT (OUTPATIENT)
Dept: RADIOLOGY | Facility: MEDICAL CENTER | Age: 57
End: 2022-11-18
Attending: EMERGENCY MEDICINE
Payer: COMMERCIAL

## 2022-11-18 VITALS
RESPIRATION RATE: 17 BRPM | HEIGHT: 66 IN | DIASTOLIC BLOOD PRESSURE: 77 MMHG | BODY MASS INDEX: 38.87 KG/M2 | SYSTOLIC BLOOD PRESSURE: 153 MMHG | WEIGHT: 241.84 LBS | OXYGEN SATURATION: 91 % | TEMPERATURE: 97.4 F | HEART RATE: 83 BPM

## 2022-11-18 DIAGNOSIS — R10.32 LEFT INGUINAL PAIN: ICD-10-CM

## 2022-11-18 DIAGNOSIS — K40.90 LEFT INGUINAL HERNIA: ICD-10-CM

## 2022-11-18 LAB
ALBUMIN SERPL BCP-MCNC: 4.4 G/DL (ref 3.2–4.9)
ALBUMIN/GLOB SERPL: 1.6 G/DL
ALP SERPL-CCNC: 96 U/L (ref 30–99)
ALT SERPL-CCNC: 47 U/L (ref 2–50)
ANION GAP SERPL CALC-SCNC: 11 MMOL/L (ref 7–16)
APPEARANCE UR: CLEAR
AST SERPL-CCNC: 30 U/L (ref 12–45)
BACTERIA #/AREA URNS HPF: NEGATIVE /HPF
BASOPHILS # BLD AUTO: 0.8 % (ref 0–1.8)
BASOPHILS # BLD: 0.08 K/UL (ref 0–0.12)
BILIRUB SERPL-MCNC: 0.3 MG/DL (ref 0.1–1.5)
BILIRUB UR QL STRIP.AUTO: NEGATIVE
BUN SERPL-MCNC: 15 MG/DL (ref 8–22)
CALCIUM SERPL-MCNC: 9.6 MG/DL (ref 8.5–10.5)
CHLORIDE SERPL-SCNC: 106 MMOL/L (ref 96–112)
CO2 SERPL-SCNC: 26 MMOL/L (ref 20–33)
COLOR UR: YELLOW
CREAT SERPL-MCNC: 0.8 MG/DL (ref 0.5–1.4)
EOSINOPHIL # BLD AUTO: 0.63 K/UL (ref 0–0.51)
EOSINOPHIL NFR BLD: 6 % (ref 0–6.9)
EPI CELLS #/AREA URNS HPF: NEGATIVE /HPF
ERYTHROCYTE [DISTWIDTH] IN BLOOD BY AUTOMATED COUNT: 47.8 FL (ref 35.9–50)
GFR SERPLBLD CREATININE-BSD FMLA CKD-EPI: 87 ML/MIN/1.73 M 2
GLOBULIN SER CALC-MCNC: 2.8 G/DL (ref 1.9–3.5)
GLUCOSE SERPL-MCNC: 96 MG/DL (ref 65–99)
GLUCOSE UR STRIP.AUTO-MCNC: NEGATIVE MG/DL
HCT VFR BLD AUTO: 47.6 % (ref 37–47)
HGB BLD-MCNC: 15.8 G/DL (ref 12–16)
HYALINE CASTS #/AREA URNS LPF: ABNORMAL /LPF
IMM GRANULOCYTES # BLD AUTO: 0.06 K/UL (ref 0–0.11)
IMM GRANULOCYTES NFR BLD AUTO: 0.6 % (ref 0–0.9)
KETONES UR STRIP.AUTO-MCNC: NEGATIVE MG/DL
LEUKOCYTE ESTERASE UR QL STRIP.AUTO: NEGATIVE
LIPASE SERPL-CCNC: 31 U/L (ref 11–82)
LYMPHOCYTES # BLD AUTO: 3.79 K/UL (ref 1–4.8)
LYMPHOCYTES NFR BLD: 36.3 % (ref 22–41)
MCH RBC QN AUTO: 31.3 PG (ref 27–33)
MCHC RBC AUTO-ENTMCNC: 33.2 G/DL (ref 33.6–35)
MCV RBC AUTO: 94.3 FL (ref 81.4–97.8)
MICRO URNS: ABNORMAL
MONOCYTES # BLD AUTO: 0.51 K/UL (ref 0–0.85)
MONOCYTES NFR BLD AUTO: 4.9 % (ref 0–13.4)
NEUTROPHILS # BLD AUTO: 5.36 K/UL (ref 2–7.15)
NEUTROPHILS NFR BLD: 51.4 % (ref 44–72)
NITRITE UR QL STRIP.AUTO: NEGATIVE
NRBC # BLD AUTO: 0 K/UL
NRBC BLD-RTO: 0 /100 WBC
PH UR STRIP.AUTO: 5.5 [PH] (ref 5–8)
PLATELET # BLD AUTO: 282 K/UL (ref 164–446)
PMV BLD AUTO: 9.7 FL (ref 9–12.9)
POTASSIUM SERPL-SCNC: 4.1 MMOL/L (ref 3.6–5.5)
PROT SERPL-MCNC: 7.2 G/DL (ref 6–8.2)
PROT UR QL STRIP: 30 MG/DL
RBC # BLD AUTO: 5.05 M/UL (ref 4.2–5.4)
RBC # URNS HPF: ABNORMAL /HPF
RBC UR QL AUTO: ABNORMAL
SODIUM SERPL-SCNC: 143 MMOL/L (ref 135–145)
SP GR UR STRIP.AUTO: 1.02
UROBILINOGEN UR STRIP.AUTO-MCNC: 0.2 MG/DL
WBC # BLD AUTO: 10.4 K/UL (ref 4.8–10.8)
WBC #/AREA URNS HPF: ABNORMAL /HPF

## 2022-11-18 PROCEDURE — 36415 COLL VENOUS BLD VENIPUNCTURE: CPT

## 2022-11-18 PROCEDURE — 700102 HCHG RX REV CODE 250 W/ 637 OVERRIDE(OP): Performed by: EMERGENCY MEDICINE

## 2022-11-18 PROCEDURE — 76857 US EXAM PELVIC LIMITED: CPT

## 2022-11-18 PROCEDURE — 99284 EMERGENCY DEPT VISIT MOD MDM: CPT

## 2022-11-18 PROCEDURE — 74018 RADEX ABDOMEN 1 VIEW: CPT

## 2022-11-18 PROCEDURE — A9270 NON-COVERED ITEM OR SERVICE: HCPCS | Performed by: EMERGENCY MEDICINE

## 2022-11-18 PROCEDURE — 85025 COMPLETE CBC W/AUTO DIFF WBC: CPT

## 2022-11-18 PROCEDURE — 81001 URINALYSIS AUTO W/SCOPE: CPT

## 2022-11-18 PROCEDURE — 80053 COMPREHEN METABOLIC PANEL: CPT

## 2022-11-18 PROCEDURE — 83690 ASSAY OF LIPASE: CPT

## 2022-11-18 RX ORDER — HYDROCODONE BITARTRATE AND ACETAMINOPHEN 5; 325 MG/1; MG/1
1 TABLET ORAL ONCE
Status: COMPLETED | OUTPATIENT
Start: 2022-11-18 | End: 2022-11-18

## 2022-11-18 RX ORDER — HYDROCODONE BITARTRATE AND ACETAMINOPHEN 5; 325 MG/1; MG/1
1 TABLET ORAL EVERY 4 HOURS PRN
Qty: 9 TABLET | Refills: 0 | Status: SHIPPED | OUTPATIENT
Start: 2022-11-18 | End: 2022-11-20

## 2022-11-18 RX ADMIN — HYDROCODONE BITARTRATE AND ACETAMINOPHEN 1 TABLET: 5; 325 TABLET ORAL at 19:02

## 2022-11-18 ASSESSMENT — PAIN DESCRIPTION - PAIN TYPE
TYPE: ACUTE PAIN
TYPE: ACUTE PAIN

## 2022-11-18 ASSESSMENT — FIBROSIS 4 INDEX: FIB4 SCORE: 0.93

## 2022-11-18 NOTE — Clinical Note
Cheyenne Kelley was seen and treated in our emergency department on 11/18/2022.  She may return to work on 11/25/2022.       If you have any questions or concerns, please don't hesitate to call.      Marielle Del Cid M.D.

## 2022-11-19 NOTE — ED PROVIDER NOTES
ED Physician Note    Chief Concern:   Left inguinal pain    HPI:  Cheyenne Kelley is a very pleasant 57-year-old woman who presents to the emergency department for evaluation of left inguinal pain.  She has been seen twice in this emergency department in the past 2 weeks for similar symptoms.  After she was most recently seen at this facility 11/11/2022, she states her pain had improved for about a day or 2.  She was diagnosed with a left inguinal hernia that was reduced using ultrasound guidance.  However, her pain then recurred.  She made an appointment for an outpatient general surgery clinic visit, that visit is in 5 days, however when she went back to work today the pain became unbearable.  Pain remains localized to left lower quadrant.  She has no associated nausea or vomiting, no constipation, no diarrhea.  She reports no associated fevers.  She has no pain that radiates up into the upper abdomen, or mid abdomen.  She is not noticed any overlying skin changes.  She states that she is not able to feel a bulge of the hernia, likely due to BMI.  She is unable to identify any reliably alleviating factors, states that symptoms are worsened by standing up and moving around, though are not completely alleviated when she rests.  She is not taking any over-the-counter medication for pain relief.  She reports no significant past medical history.    Review of Systems:  See HPI for pertinent positives and negatives. All other systems negative.    Past Medical History:       Social History:  Social History     Tobacco Use    Smoking status: Every Day     Packs/day: 0.50     Types: Cigarettes    Smokeless tobacco: Never   Vaping Use    Vaping Use: Never used   Substance and Sexual Activity    Alcohol use: Yes     Comment: Occ/soc    Drug use: Never    Sexual activity: Not on file       Surgical History:   has a past surgical history that includes cystoscopy stent placement.    Current Medications:  Home Medications        "Reviewed by Pamela Reynoso R.N. (Registered Nurse) on 11/18/22 at 1713  Med List Status: Partial     Medication Last Dose Status   acetaminophen (PHARBETOL EXTRA STRENGTH) 500 MG Tab  Active   ibuprofen (MOTRIN) 600 MG Tab  Active   naproxen (NAPROSYN) 500 MG Tab  Active   ondansetron (ZOFRAN ODT) 4 MG TABLET DISPERSIBLE  Active   tamsulosin (FLOMAX) 0.4 MG capsule  Active                    Allergies:  No Known Allergies    Physical Exam:  Vital Signs: BP (!) 153/77   Pulse 83   Temp 36.3 °C (97.4 °F) (Temporal)   Resp 17   Ht 1.676 m (5' 6\")   Wt 110 kg (241 lb 13.5 oz)   SpO2 91%   BMI 39.03 kg/m²   Constitutional: Alert, no acute distress  HENT: Normocephalic, mask in place  Eyes: Pupils equal and reactive, normal conjunctiva  Neck: Supple, normal range of motion, no stridor  Cardiovascular: Extremities are warm and well perfused  Pulmonary: No respiratory distress, normal work of breathing  Abdomen: Soft, non-distended, non-tender to palpation, no peritoneal signs, she does have some localized tenderness to palpation in the left inguinal region, I do not palpate a definite hernia nor hernia defect, no abnormal distention to suggest bowel obstruction  Skin: Warm, dry, no rashes or lesions  Musculoskeletal: Normal range of motion in all extremities, no swelling or deformity noted  Neurologic: Alert, oriented, normal speech, normal motor function  Psychiatric: Normal and appropriate mood and affect    Medical records reviewed for continuity of care.   Ms. Kelley was seen in this emergency department 11/11/2022 for evaluation of abdominal pain.  This is localized to the left inguinal area.  Notes that she was seen 1 day prior and had a CT scan at that visit.  She had blood in her urine, so there was a thought that she may have passed a kidney stone.  Left inguinal hernia was noted as well.  At that visit her white blood count was normal at 9.2.  CT scan showed a possible fat hernia.  Appears as though the " hernia was reduced, she already has an appointment with a surgeon, she was discharged home in stable condition.    Reviewed her most recent CT report, this was done 11/9/2022.  She was found to have cholelithiasis without evidence of cholecystitis or biliary dilatation.  No urolithiasis or hydronephrosis noted.    Labs:  Labs Reviewed   CBC WITH DIFFERENTIAL - Abnormal; Notable for the following components:       Result Value    Hematocrit 47.6 (*)     MCHC 33.2 (*)     Eos (Absolute) 0.63 (*)     All other components within normal limits   URINALYSIS - Abnormal; Notable for the following components:    Protein 30 (*)     Occult Blood Trace (*)     All other components within normal limits   URINE MICROSCOPIC (W/UA) - Abnormal; Notable for the following components:    RBC 5-10 (*)     All other components within normal limits   COMP METABOLIC PANEL   LIPASE   ESTIMATED GFR       Radiology:  US-INGUINAL HERNIA   Final Result      1.  Left inguinal hernia is identified containing abdominal fat. No peristalsing bowel loops are identified. Hernia appears reducible.      2.  No other abnormalities identified.      [id number] VIEW   Final Result      No evidence of bowel obstruction.   Possible constipation.                       ED Medications Administered:  Medications   HYDROcodone-acetaminophen (NORCO) 5-325 MG per tablet 1 Tablet (1 Tablet Oral Given 11/18/22 1902)       Differential diagnosis:  Inguinal fat hernia, incarcerated hernia, bowel obstruction, no pain out of portion to exam to suggest ischemia    MDM:  Ms. Kelley presents to the emergency department today for recurrent left inguinal pain.  She was seen in this emergency department previously for similar symptoms, was diagnosed with a left inguinal hernia.  She does have an outpatient follow-up appointment in 5 days with a general surgeon for discussion of repair.  On arrival to the emergency department her abdomen is not significantly distended, she  does have some tenderness to palpation in the inguinal canal, however no palpable hernia, though exam is limited by BMI.  She is afebrile, she has no tachycardia, no hypotension, no evidence of severe infection.    On laboratory evaluation urinalysis is negative for evidence of infection.  CMP is reassuring without any significant abnormalities.  Lipase is within normal limits.  She has a normal white blood count and normal differential, no bands resulted at this time.    Plain film of the abdomen demonstrates no evidence of bowel obstruction.    Ultrasound of the inguinal region again demonstrates a left inguinal hernia containing abdominal fat only.  No peristalsing bowel loops are identified.  Hernia appears reducible.    On further discussion, she states she originally had a general surgery appointment scheduled for 2 days ago, however she rescheduled it due to her work schedule.  She was feeling better at home, and her pain was alleviated by the medications provided, however when she went back to work her pain became much more severe.  She now has an appointment scheduled 5 days from now.  Her primary concern is the need to return to work, she states if she is allowed to rest at home her pain is well controlled with pain medication.  I did provide short prescription for hydrocodone, as well as a work note.  She is counseled to keep her appointment with general surgery on Wednesday of next week, 5 days from today. Return precautions were discussed with the patient, and provided in written form with the patient's discharge instructions.       Disposition:  Discharge home in stable condition    Final Impression:  1. Left inguinal pain    2. Left inguinal hernia        Electronically signed by Marielle Del Cid MD

## 2022-11-19 NOTE — DISCHARGE INSTRUCTIONS
Please follow up with your primary care physician in 24 to 48 hours for abdominal recheck if your symptoms do not respond to the pain medication. Call your primary care physician at the opening of business hours to let them know you were seen in the emergency department. Return immediately if your pain returns or worsens, if you develop any new symptoms, if you are not able to drink fluids, if you have persistent vomiting, if you develop fevers, or if you have any further concerns. Additionally, please return if your symptoms have not resolved and you are unable to follow up with your primary care physician for recheck.

## 2022-12-06 ENCOUNTER — PRE-ADMISSION TESTING (OUTPATIENT)
Dept: ADMISSIONS | Facility: MEDICAL CENTER | Age: 57
End: 2022-12-06
Attending: SURGERY
Payer: COMMERCIAL

## 2022-12-06 ASSESSMENT — FIBROSIS 4 INDEX: FIB4 SCORE: 0.85

## 2022-12-13 ENCOUNTER — ANESTHESIA EVENT (OUTPATIENT)
Dept: SURGERY | Facility: MEDICAL CENTER | Age: 57
End: 2022-12-13
Payer: COMMERCIAL

## 2022-12-13 ENCOUNTER — HOSPITAL ENCOUNTER (OUTPATIENT)
Facility: MEDICAL CENTER | Age: 57
End: 2022-12-13
Attending: SURGERY | Admitting: SURGERY
Payer: COMMERCIAL

## 2022-12-13 ENCOUNTER — ANESTHESIA (OUTPATIENT)
Dept: SURGERY | Facility: MEDICAL CENTER | Age: 57
End: 2022-12-13
Payer: COMMERCIAL

## 2022-12-13 VITALS
DIASTOLIC BLOOD PRESSURE: 77 MMHG | SYSTOLIC BLOOD PRESSURE: 143 MMHG | HEIGHT: 65 IN | TEMPERATURE: 96.5 F | RESPIRATION RATE: 16 BRPM | OXYGEN SATURATION: 93 % | WEIGHT: 239.86 LBS | HEART RATE: 85 BPM | BODY MASS INDEX: 39.96 KG/M2

## 2022-12-13 DIAGNOSIS — G89.18 POST-OP PAIN: ICD-10-CM

## 2022-12-13 PROCEDURE — 160009 HCHG ANES TIME/MIN: Performed by: SURGERY

## 2022-12-13 PROCEDURE — 700111 HCHG RX REV CODE 636 W/ 250 OVERRIDE (IP): Performed by: ANESTHESIOLOGY

## 2022-12-13 PROCEDURE — 160035 HCHG PACU - 1ST 60 MINS PHASE I: Performed by: SURGERY

## 2022-12-13 PROCEDURE — 700102 HCHG RX REV CODE 250 W/ 637 OVERRIDE(OP): Performed by: ANESTHESIOLOGY

## 2022-12-13 PROCEDURE — 160025 RECOVERY II MINUTES (STATS): Performed by: SURGERY

## 2022-12-13 PROCEDURE — 160002 HCHG RECOVERY MINUTES (STAT): Performed by: SURGERY

## 2022-12-13 PROCEDURE — 160029 HCHG SURGERY MINUTES - 1ST 30 MINS LEVEL 4: Performed by: SURGERY

## 2022-12-13 PROCEDURE — 160048 HCHG OR STATISTICAL LEVEL 1-5: Performed by: SURGERY

## 2022-12-13 PROCEDURE — 160036 HCHG PACU - EA ADDL 30 MINS PHASE I: Performed by: SURGERY

## 2022-12-13 PROCEDURE — 700105 HCHG RX REV CODE 258: Performed by: SURGERY

## 2022-12-13 PROCEDURE — 160046 HCHG PACU - 1ST 60 MINS PHASE II: Performed by: SURGERY

## 2022-12-13 PROCEDURE — C1781 MESH (IMPLANTABLE): HCPCS | Performed by: SURGERY

## 2022-12-13 PROCEDURE — 00790 ANES IPER UPR ABD NOS: CPT | Performed by: ANESTHESIOLOGY

## 2022-12-13 PROCEDURE — 502714 HCHG ROBOTIC SURGERY SERVICES: Performed by: SURGERY

## 2022-12-13 PROCEDURE — A9270 NON-COVERED ITEM OR SERVICE: HCPCS | Performed by: ANESTHESIOLOGY

## 2022-12-13 PROCEDURE — 700101 HCHG RX REV CODE 250: Performed by: ANESTHESIOLOGY

## 2022-12-13 PROCEDURE — 160041 HCHG SURGERY MINUTES - EA ADDL 1 MIN LEVEL 4: Performed by: SURGERY

## 2022-12-13 PROCEDURE — 700101 HCHG RX REV CODE 250: Performed by: SURGERY

## 2022-12-13 DEVICE — MESH PROGRIP LAPROSCOPIC SELF FIXATING (1/CA): Type: IMPLANTABLE DEVICE | Status: FUNCTIONAL

## 2022-12-13 RX ORDER — OXYCODONE HCL 5 MG/5 ML
5 SOLUTION, ORAL ORAL
Status: COMPLETED | OUTPATIENT
Start: 2022-12-13 | End: 2022-12-13

## 2022-12-13 RX ORDER — HYDROMORPHONE HYDROCHLORIDE 1 MG/ML
0.2 INJECTION, SOLUTION INTRAMUSCULAR; INTRAVENOUS; SUBCUTANEOUS
Status: DISCONTINUED | OUTPATIENT
Start: 2022-12-13 | End: 2022-12-13 | Stop reason: HOSPADM

## 2022-12-13 RX ORDER — DIPHENHYDRAMINE HYDROCHLORIDE 50 MG/ML
12.5 INJECTION INTRAMUSCULAR; INTRAVENOUS
Status: DISCONTINUED | OUTPATIENT
Start: 2022-12-13 | End: 2022-12-13 | Stop reason: HOSPADM

## 2022-12-13 RX ORDER — DEXAMETHASONE SODIUM PHOSPHATE 4 MG/ML
INJECTION, SOLUTION INTRA-ARTICULAR; INTRALESIONAL; INTRAMUSCULAR; INTRAVENOUS; SOFT TISSUE PRN
Status: DISCONTINUED | OUTPATIENT
Start: 2022-12-13 | End: 2022-12-13 | Stop reason: HOSPADM

## 2022-12-13 RX ORDER — LIDOCAINE HYDROCHLORIDE 20 MG/ML
INJECTION, SOLUTION EPIDURAL; INFILTRATION; INTRACAUDAL; PERINEURAL PRN
Status: DISCONTINUED | OUTPATIENT
Start: 2022-12-13 | End: 2022-12-13 | Stop reason: SURG

## 2022-12-13 RX ORDER — SODIUM CHLORIDE, SODIUM LACTATE, POTASSIUM CHLORIDE, CALCIUM CHLORIDE 600; 310; 30; 20 MG/100ML; MG/100ML; MG/100ML; MG/100ML
INJECTION, SOLUTION INTRAVENOUS CONTINUOUS
Status: ACTIVE | OUTPATIENT
Start: 2022-12-13 | End: 2022-12-13

## 2022-12-13 RX ORDER — MEPERIDINE HYDROCHLORIDE 25 MG/ML
12.5 INJECTION INTRAMUSCULAR; INTRAVENOUS; SUBCUTANEOUS
Status: DISCONTINUED | OUTPATIENT
Start: 2022-12-13 | End: 2022-12-13 | Stop reason: HOSPADM

## 2022-12-13 RX ORDER — HYDROMORPHONE HYDROCHLORIDE 1 MG/ML
0.4 INJECTION, SOLUTION INTRAMUSCULAR; INTRAVENOUS; SUBCUTANEOUS
Status: DISCONTINUED | OUTPATIENT
Start: 2022-12-13 | End: 2022-12-13 | Stop reason: HOSPADM

## 2022-12-13 RX ORDER — OXYCODONE HYDROCHLORIDE 5 MG/1
5 TABLET ORAL EVERY 4 HOURS PRN
Qty: 15 TABLET | Refills: 0 | Status: SHIPPED | OUTPATIENT
Start: 2022-12-13 | End: 2022-12-17

## 2022-12-13 RX ORDER — OXYCODONE HCL 5 MG/5 ML
10 SOLUTION, ORAL ORAL
Status: COMPLETED | OUTPATIENT
Start: 2022-12-13 | End: 2022-12-13

## 2022-12-13 RX ORDER — BUPIVACAINE HYDROCHLORIDE AND EPINEPHRINE 5; 5 MG/ML; UG/ML
INJECTION, SOLUTION EPIDURAL; INTRACAUDAL; PERINEURAL
Status: DISCONTINUED | OUTPATIENT
Start: 2022-12-13 | End: 2022-12-13 | Stop reason: HOSPADM

## 2022-12-13 RX ORDER — CEFAZOLIN SODIUM 1 G/3ML
INJECTION, POWDER, FOR SOLUTION INTRAMUSCULAR; INTRAVENOUS PRN
Status: DISCONTINUED | OUTPATIENT
Start: 2022-12-13 | End: 2022-12-13 | Stop reason: SURG

## 2022-12-13 RX ORDER — HYDROMORPHONE HYDROCHLORIDE 1 MG/ML
0.1 INJECTION, SOLUTION INTRAMUSCULAR; INTRAVENOUS; SUBCUTANEOUS
Status: DISCONTINUED | OUTPATIENT
Start: 2022-12-13 | End: 2022-12-13 | Stop reason: HOSPADM

## 2022-12-13 RX ORDER — MIDAZOLAM HYDROCHLORIDE 1 MG/ML
INJECTION INTRAMUSCULAR; INTRAVENOUS PRN
Status: DISCONTINUED | OUTPATIENT
Start: 2022-12-13 | End: 2022-12-13 | Stop reason: SURG

## 2022-12-13 RX ORDER — ACETAMINOPHEN 500 MG
1000 TABLET ORAL ONCE
Status: COMPLETED | OUTPATIENT
Start: 2022-12-13 | End: 2022-12-13

## 2022-12-13 RX ORDER — HALOPERIDOL 5 MG/ML
1 INJECTION INTRAMUSCULAR
Status: DISCONTINUED | OUTPATIENT
Start: 2022-12-13 | End: 2022-12-13 | Stop reason: HOSPADM

## 2022-12-13 RX ORDER — ROCURONIUM BROMIDE 10 MG/ML
INJECTION, SOLUTION INTRAVENOUS PRN
Status: DISCONTINUED | OUTPATIENT
Start: 2022-12-13 | End: 2022-12-13 | Stop reason: HOSPADM

## 2022-12-13 RX ORDER — ONDANSETRON 2 MG/ML
4 INJECTION INTRAMUSCULAR; INTRAVENOUS
Status: DISCONTINUED | OUTPATIENT
Start: 2022-12-13 | End: 2022-12-13 | Stop reason: HOSPADM

## 2022-12-13 RX ORDER — SODIUM CHLORIDE, SODIUM LACTATE, POTASSIUM CHLORIDE, CALCIUM CHLORIDE 600; 310; 30; 20 MG/100ML; MG/100ML; MG/100ML; MG/100ML
INJECTION, SOLUTION INTRAVENOUS CONTINUOUS
Status: DISCONTINUED | OUTPATIENT
Start: 2022-12-13 | End: 2022-12-13 | Stop reason: HOSPADM

## 2022-12-13 RX ORDER — ONDANSETRON 2 MG/ML
INJECTION INTRAMUSCULAR; INTRAVENOUS PRN
Status: DISCONTINUED | OUTPATIENT
Start: 2022-12-13 | End: 2022-12-13 | Stop reason: SURG

## 2022-12-13 RX ADMIN — DEXAMETHASONE SODIUM PHOSPHATE 4 MG: 4 INJECTION, SOLUTION INTRA-ARTICULAR; INTRALESIONAL; INTRAMUSCULAR; INTRAVENOUS; SOFT TISSUE at 10:55

## 2022-12-13 RX ADMIN — FENTANYL CITRATE 25 MCG: 50 INJECTION INTRAMUSCULAR; INTRAVENOUS at 13:00

## 2022-12-13 RX ADMIN — PROPOFOL 150 MG: 10 INJECTION, EMULSION INTRAVENOUS at 10:55

## 2022-12-13 RX ADMIN — FENTANYL CITRATE 100 MCG: 50 INJECTION, SOLUTION INTRAMUSCULAR; INTRAVENOUS at 10:55

## 2022-12-13 RX ADMIN — FENTANYL CITRATE 25 MCG: 50 INJECTION INTRAMUSCULAR; INTRAVENOUS at 12:27

## 2022-12-13 RX ADMIN — OXYCODONE HYDROCHLORIDE 10 MG: 5 SOLUTION ORAL at 11:54

## 2022-12-13 RX ADMIN — ONDANSETRON 4 MG: 2 INJECTION INTRAMUSCULAR; INTRAVENOUS at 10:55

## 2022-12-13 RX ADMIN — FENTANYL CITRATE 25 MCG: 50 INJECTION INTRAMUSCULAR; INTRAVENOUS at 12:24

## 2022-12-13 RX ADMIN — ROCURONIUM BROMIDE 50 MG: 10 INJECTION, SOLUTION INTRAVENOUS at 10:55

## 2022-12-13 RX ADMIN — SODIUM CHLORIDE, POTASSIUM CHLORIDE, SODIUM LACTATE AND CALCIUM CHLORIDE: 600; 310; 30; 20 INJECTION, SOLUTION INTRAVENOUS at 09:46

## 2022-12-13 RX ADMIN — LIDOCAINE HYDROCHLORIDE 100 MG: 20 INJECTION, SOLUTION EPIDURAL; INFILTRATION; INTRACAUDAL at 10:55

## 2022-12-13 RX ADMIN — ACETAMINOPHEN 1000 MG: 500 TABLET ORAL at 09:46

## 2022-12-13 RX ADMIN — CEFAZOLIN 2 G: 330 INJECTION, POWDER, FOR SOLUTION INTRAMUSCULAR; INTRAVENOUS at 10:45

## 2022-12-13 RX ADMIN — SUGAMMADEX 200 MG: 100 INJECTION, SOLUTION INTRAVENOUS at 11:29

## 2022-12-13 RX ADMIN — MIDAZOLAM HYDROCHLORIDE 2 MG: 1 INJECTION, SOLUTION INTRAMUSCULAR; INTRAVENOUS at 10:55

## 2022-12-13 ASSESSMENT — PAIN DESCRIPTION - PAIN TYPE
TYPE: ACUTE PAIN;SURGICAL PAIN
TYPE: SURGICAL PAIN
TYPE: ACUTE PAIN;SURGICAL PAIN
TYPE: SURGICAL PAIN
TYPE: ACUTE PAIN;SURGICAL PAIN
TYPE: SURGICAL PAIN

## 2022-12-13 ASSESSMENT — FIBROSIS 4 INDEX: FIB4 SCORE: 0.85

## 2022-12-13 ASSESSMENT — PAIN SCALES - GENERAL: PAIN_LEVEL: 0

## 2022-12-13 NOTE — ANESTHESIA PREPROCEDURE EVALUATION
Case: [id number] Date/Time: 12/13/22 1045    Procedure: ROBOTIC- ASSISTED LEFT INGUINAL HERNIA REPAIR WITH MESH    Pre-op diagnosis: LEFT INGUINAL HERNIA    Location: [facility] OR 08 / SURGERY [facility]    Surgeons: Regino Erickson M.D.          Relevant Problems      (positive) Kidney stone       Physical Exam    Airway   Mallampati: II  TM distance: >3 FB  Neck ROM: full       Cardiovascular - normal exam  Rhythm: regular  Rate: normal  (-) murmur     Dental - normal exam           Pulmonary - normal exam  Breath sounds clear to auscultation     Abdominal    Neurological - normal exam                 Anesthesia Plan    ASA 2       Plan - general       Airway plan will be ETT          Induction: intravenous    Postoperative Plan: Postoperative administration of opioids is intended.    Pertinent diagnostic labs and testing reviewed    Informed Consent:    Anesthetic plan and risks discussed with patient.    Use of blood products discussed with: patient whom consented to blood products.

## 2022-12-13 NOTE — OR NURSING
1206: pt's  Bryant phoned and updated on pt status in Recovery.  Bryant is in the surgical waiting area.   1309: Bryant updated on pt status in Recovery and transfer to discharge.

## 2022-12-13 NOTE — ANESTHESIA PROCEDURE NOTES
Airway    Date/Time: 12/13/2022 10:55 AM  Performed by: Kishor Smith M.D.  Authorized by: Kishor Smith M.D.     Location:  OR  Urgency:  Elective  Indications for Airway Management:  Anesthesia      Spontaneous Ventilation: absent    Sedation Level:  Deep  Preoxygenated: Yes    Patient Position:  Sniffing  Final Airway Type:  Endotracheal airway  Final Endotracheal Airway:  ETT  Cuffed: Yes    Technique Used for Successful ETT Placement:  Direct laryngoscopy    Insertion Site:  Oral  Blade Type:  Herrera  Laryngoscope Blade/Videolaryngoscope Blade Size:  2  ETT Size (mm):  7.0  Measured from:  Teeth  ETT to Teeth (cm):  22  Placement Verified by: auscultation and capnometry    Cormack-Lehane Classification:  Grade I - full view of glottis  Number of Attempts at Approach:  1

## 2022-12-13 NOTE — ANESTHESIA POSTPROCEDURE EVALUATION
Patient: Cheyenne Kelley    Procedure Summary     Date: 12/13/22 Room / Location: [facility] 08 / SURGERY [facility]    Anesthesia Start: 1045 Anesthesia Stop:     Procedure: ROBOTIC- ASSISTED LEFT INGUINAL HERNIA REPAIR WITH MESH (Left: Abdomen) Diagnosis: (LEFT INGUINAL HERNIA)    Surgeons: Regino Erickson M.D. Responsible Provider: Kishor Smith M.D.    Anesthesia Type: general ASA Status: 2          Final Anesthesia Type: general  Last vitals  BP   Blood Pressure: (!) 140/70    Temp   36.6 °C (97.8 °F)    Pulse   80   Resp   18    SpO2   93 %      Anesthesia Post Evaluation    Patient location during evaluation: PACU  Patient participation: complete - patient participated  Level of consciousness: awake and alert  Pain score: 0    Airway patency: patent  Anesthetic complications: no  Cardiovascular status: hemodynamically stable  Respiratory status: acceptable  Hydration status: euvolemic    PONV: none          There were no known notable events for this encounter.     Nurse Pain Score: 5 (NPRS)

## 2022-12-13 NOTE — DISCHARGE INSTRUCTIONS
HOME CARE INSTRUCTIONS    ACTIVITY: Rest and take it easy for the first 24 hours.  A responsible adult is recommended to remain with you during that time.  It is normal to feel sleepy.  We encourage you to not do anything that requires balance, judgment or coordination.    FOR 24 HOURS DO NOT:  Drive, operate machinery or run household appliances.  Drink beer or alcoholic beverages.  Make important decisions or sign legal documents.    SPECIAL INSTRUCTIONS:   1.   The pain medication is extremely constipating.    Take a stool softener and drink lots of water.  It also can be addicting.  Please try to transition to an over the counter pain remedy as soon as possible.    2.   Alternating ice and heat for 30 minutes can greatly reduce your pain.    3.   It's ok to shower on the day after your surgery.   Do not scrub the incisions.    4.   After laparoscopy, it's common to have shoulder pain or pain when you take a deep breath.   If the pain radiates down your arm, call or present to the ER.    5.   Please call for redness or drainage from the wound sites that persists.     6.   Feel free to call with questions at [phone].   If you have paperwork that needs filling out, please contact us at this number.    7.   Follow up with me in 1-2 weeks for your postoperative exam.    8.   Activity restrictions vary according to the surgery.   If it hurts, don't do it.   You may begin light exercise at 7-10 days.    Regino Erickson MD [facility]  [phone]    DIET: To avoid nausea, slowly advance diet as tolerated, avoiding spicy or greasy foods for the first day.  Add more substantial food to your diet according to your physician's instructions.  INCREASE FLUIDS AND FIBER TO AVOID CONSTIPATION.    MEDICATIONS: Resume taking daily medication.  Take prescribed pain medication with food.  If no medication is prescribed, you may take non-aspirin pain medication if needed.  PAIN MEDICATION CAN BE VERY CONSTIPATING.   Take a stool softener or laxative such as senokot, pericolace, or milk of magnesia if needed.    Prescription given for oxycodone.  Last pain medication given at 12:00pm (10mg oxycodone).    A follow-up appointment should be arranged with your doctor; call to schedule.    You should CALL YOUR PHYSICIAN if you develop:  Fever greater than 101 degrees F.  Pain not relieved by medication, or persistent nausea or vomiting.  Excessive bleeding (blood soaking through dressing) or unexpected drainage from the wound.  Extreme redness or swelling around the incision site, drainage of pus or foul smelling drainage.  Inability to urinate or empty your bladder within 8 hours.  Problems with breathing or chest pain.    You should call 911 if you develop problems with breathing or chest pain.  If you are unable to contact your doctor or surgical center, you should go to the nearest emergency room or urgent care center.  Physician's telephone #: [phone]    MILD FLU-LIKE SYMPTOMS ARE NORMAL.  YOU MAY EXPERIENCE GENERALIZED MUSCLE ACHES, THROAT IRRITATION, HEADACHE AND/OR SOME NAUSEA.    If any questions arise, call your doctor.  If your doctor is not available, please feel free to call the Surgical Center at [phone].  The Center is open Monday through Friday from 7AM to 7PM.      A registered nurse may call you a few days after your surgery to see how you are doing after your procedure.    You may also receive a survey in the mail within the next two weeks and we ask that you take a few moments to complete the survey and return it to us.  Our goal is to provide you with very good care and we value your comments.     Depression / Suicide Risk    As you are discharged from this [facility] facility, it is important to learn how to keep safe from harming yourself.    Recognize the warning signs:  Abrupt changes in personality, positive or negative- including increase in energy   Giving away possessions  Change in eating  patterns- significant weight changes-  positive or negative  Change in sleeping patterns- unable to sleep or sleeping all the time   Unwillingness or inability to communicate  Depression  Unusual sadness, discouragement and loneliness  Talk of wanting to die  Neglect of personal appearance   Rebelliousness- reckless behavior  Withdrawal from people/activities they love  Confusion- inability to concentrate     If you or a loved one observes any of these behaviors or has concerns about self-harm, here's what you can do:  Talk about it- your feelings and reasons for harming yourself  Remove any means that you might use to hurt yourself (examples: pills, rope, extension cords, firearm)  Get professional help from the community (Mental Health, Substance Abuse, psychological counseling)  Do not be alone:Call your Safe Contact- someone whom you trust who will be there for you.  Call your local CRISIS HOTLINE 699-5835 or 202-406-7527  Call your local Children's Mobile Crisis Response Team [place] (858) 071-6907 or www.[employer]  Call the toll free National Suicide Prevention Hotlines   National Suicide Prevention Lifeline 647-780-RSVP (8725)  National Hope Line Network 800-SUICIDE (271-3447)    I acknowledge receipt and understanding of these Home Care instructions.

## 2022-12-13 NOTE — OR NURSING
Pt on RA.  No c/o nausea, tolerating PO fluids and medication.  X 3 laparoscopic abdomina sites CDI, Dermabond.  Ice pack to abdomen.  Surgical pain tolerable per pt, 5/10.  VSS, afebrile, CASIANO, A/O x4.     Prescription e-scribed for oxycodone IR.    No belongings in PACU.   Transferred with face mask in place.   Bedside handoff to Humaira POSEY.

## 2022-12-13 NOTE — OP REPORT
OPERATIVE NOTE    PREOPERATIVE DIAGNOSIS: Left inguinal hernia    POSTOPERATIVE DIAGNOSIS: Indirect left inguinal hernia with cord lipoma    PROCEDURE PERFORMED: Robotic assisted laparoscopic left inguinal hernia repair with mesh    SURGEON: Regino Erickson M.D.    ASSISTANT: Chery Nguyen    An assistant was required for the safe completion of the surgical case.  The assistant provided retraction, exposure, performed wound closure and assisted with instrumentation promoting the efficiency of the surgery performed.  I felt an assistant was necessary in the interest of safety and efficiency.  My request for assistance is based on my training and experience and should be patently obvious to the most casual observer as necessary.     ANESTHESIOLOGIST:  Anesthesiologist: Kishor Smith M.D.     ANESTHESIA: general     FINDINGS:  Indirect left inguinal hernia with cord lipoma.     WOUND CLASS: clean    SPECIMEN: none     ESTIMATED BLOOD LOSS: 5 mL    Indication: Pt is a 58 yo female who presents with a left inguinal hernia seen on ct scan and left groin pain.    Description of the procedure:    The patient was prepped and draped in the standard sterile surgical fashion after induction of general anesthesia.  Appropriate timeout had been performed and antibiotics delivered.  A left upper quadrant verres insertion was performed and the abdomen was insufflated to 15 mmHg of CO2.  The robotic trocar was applied.  Two more robotic trocars were placed under direct visualization.  I then performed a tap block under direct visualization.  The robot was docked and I took my position at the console.    I began by creating a preperitoneal space on the left side of the premarked hernia.  I took this down to Jim's ligament.  I then freed up the space lateral to the cord to make adequate space for the mesh.  I then skeletonized the cord and reducing the hernia to well within the abdomen.  It was an indirect hernia with cord  lipoma.  I was careful to preserve the cord elements. Once the critical view of the myopectineal orifice was obtained, I placed a 10 x 15 cm progrip mesh.  This lay flat against the abdominal wall with excellent coverage of the defect.    I then used a 23 cm Stratisfix suture to reapproximate the peritoneum.  This completely hid the mesh from the abdominal cavity.  Hemostasis was achieved.  The robot was then undocked after the needles were withdrawn under direct visualization.  Monocryl was used for skin edges and an appropriate dry sterile dressing was applied.  The patient was extubated, to recovery in satisfactory condition.        ____________________________________     Regino Erickson M.D.    DT: 12/13/2022  11:27 AM      Cc: Danae Chery M.D.

## 2023-05-26 ENCOUNTER — OFFICE VISIT (OUTPATIENT)
Dept: MEDICAL GROUP | Facility: PHYSICIAN GROUP | Age: 58
End: 2023-05-26
Payer: COMMERCIAL

## 2023-05-26 VITALS
TEMPERATURE: 97.7 F | OXYGEN SATURATION: 98 % | SYSTOLIC BLOOD PRESSURE: 126 MMHG | HEIGHT: 65 IN | BODY MASS INDEX: 40.19 KG/M2 | RESPIRATION RATE: 18 BRPM | HEART RATE: 95 BPM | DIASTOLIC BLOOD PRESSURE: 82 MMHG | WEIGHT: 241.2 LBS

## 2023-05-26 DIAGNOSIS — E78.00 ELEVATED LDL CHOLESTEROL LEVEL: ICD-10-CM

## 2023-05-26 DIAGNOSIS — R63.5 WEIGHT GAIN: ICD-10-CM

## 2023-05-26 DIAGNOSIS — Z12.31 ENCOUNTER FOR SCREENING MAMMOGRAM FOR MALIGNANT NEOPLASM OF BREAST: ICD-10-CM

## 2023-05-26 DIAGNOSIS — F32.A DEPRESSION, UNSPECIFIED DEPRESSION TYPE: ICD-10-CM

## 2023-05-26 DIAGNOSIS — N20.0 KIDNEY STONE: ICD-10-CM

## 2023-05-26 DIAGNOSIS — E55.9 VITAMIN D DEFICIENCY: ICD-10-CM

## 2023-05-26 DIAGNOSIS — Z00.00 WELLNESS EXAMINATION: ICD-10-CM

## 2023-05-26 PROCEDURE — 3074F SYST BP LT 130 MM HG: CPT | Performed by: FAMILY MEDICINE

## 2023-05-26 PROCEDURE — 3079F DIAST BP 80-89 MM HG: CPT | Performed by: FAMILY MEDICINE

## 2023-05-26 PROCEDURE — 99214 OFFICE O/P EST MOD 30 MIN: CPT | Performed by: FAMILY MEDICINE

## 2023-05-26 ASSESSMENT — FIBROSIS 4 INDEX: FIB4 SCORE: 0.85

## 2023-05-26 NOTE — PROGRESS NOTES
Subjective:     CC:   Chief Complaint   Patient presents with    Follow-Up       HPI:   Cheyenne presents today for follow-up.  Patient has not followed up with me since last year.  Patient did have a hernia surgery done earlier this year this year and has recovered.  Patient is planning on following up with her counselor at this time.  Patient never did follow-up with urology or GI for her colonoscopy last year.  Patient is also concerned about her weight.    Past Medical History:   Diagnosis Date    Dental disorder 12/06/2022    partial lower plate    Psychiatric problem 12/06/2022    depression r/t bereavement    Renal disorder 12/06/2022    history of kidney stones       Social History     Tobacco Use    Smoking status: Every Day     Packs/day: 0.50     Years: 30.00     Pack years: 15.00     Types: Cigarettes    Smokeless tobacco: Never   Vaping Use    Vaping Use: Former   Substance Use Topics    Alcohol use: Yes     Comment: Occ/soc    Drug use: Not Currently     Comment: history of meth/cocaine use, quit 35 years ago       Current Outpatient Medications Ordered in Epic   Medication Sig Dispense Refill    naproxen (NAPROSYN) 500 MG Tab Take 1 Tablet by mouth 2 times a day with meals. 60 Tablet 0    ondansetron (ZOFRAN ODT) 4 MG TABLET DISPERSIBLE Take 1 Tablet by mouth every 6 hours as needed for Nausea. 10 Tablet 0     No current Epic-ordered facility-administered medications on file.       Allergies:  Patient has no known allergies.    Health Maintenance: Completed    ROS:  Gen: no fevers/chills, no changes in weight  Eyes: no changes in vision  ENT: no sore throat, no hearing loss, no bloody nose  Pulm: no sob, no cough  CV: no chest pain, no palpitations  GI: no nausea/vomiting, no diarrhea  : no dysuria  Neuro: no headaches, no numbness/tingling  Heme/Lymph: no easy bruising    Objective:     Exam:  /82 (BP Location: Right arm, Patient Position: Sitting, BP Cuff Size: Large adult)   Pulse 95   Temp  "36.5 °C (97.7 °F) (Temporal)   Resp 18   Ht 1.651 m (5' 5\")   Wt 109 kg (241 lb 3.2 oz)   SpO2 98%   BMI 40.14 kg/m²  Body mass index is 40.14 kg/m².    Gen: Alert and oriented, No apparent distress.  Skin: Warm and dry.  No obvious lesions.  Eyes: Sclera wnl Pupils normal in size  Lungs: Normal effort, CTA bilaterally, no wheezes, rhonchi, or rales  CV: Regular rate and rhythm. No murmurs, rubs, or gallops.  Musculoskeletal: Normal gait. No extremity cyanosis, clubbing, or edema.  Neuro: Oriented to person, place and time  Psych: Mood is wnl     Labs: Fasting labs were ordered patient given the listing of all the [facility] labs    Assessment & Plan:     57 y.o. female with the following -     1. Kidney stone  Recommend ordering some lab work patient states she has not had any issues with kidney stones  - CBC WITH DIFFERENTIAL; Future  - Comp Metabolic Panel; Future  - URIC ACID; Future  - URINALYSIS,CULTURE IF INDICATED; Future    2. Weight gain  Recommend ordering TSH  - TSH; Future    3. Vitamin D deficiency  I recommend we check her vitamin D level  - VITAMIN D,25 HYDROXY (DEFICIENCY); Future    4. Elevated LDL cholesterol level  I recommend we check her cholesterol  - Lipid Profile; Future    5. Encounter for screening mammogram for malignant neoplasm of breast  Referral for patient to get mammogram done patient has been doing her breast exam at times and has felt no concerns  - MA-SCREENING MAMMO BILAT W/TOMOSYNTHESIS W/O CAD; Future    6. Wellness examination  - Referral to Gastroenterology    7. Depression, unspecified depression type  I will see patient back for follow-up patient planning on calling her counselor       Return in about 3 weeks (around 6/16/2023), or if symptoms worsen or fail to improve.    Please note that this dictation was created using voice recognition software. I have made every reasonable attempt to correct obvious errors, but I expect that there are errors of grammar and possibly " content that I did not discover before finalizing the note.

## 2023-06-07 ENCOUNTER — HOSPITAL ENCOUNTER (OUTPATIENT)
Dept: RADIOLOGY | Facility: MEDICAL CENTER | Age: 58
End: 2023-06-07
Attending: FAMILY MEDICINE
Payer: COMMERCIAL

## 2023-06-07 DIAGNOSIS — Z12.31 ENCOUNTER FOR SCREENING MAMMOGRAM FOR MALIGNANT NEOPLASM OF BREAST: ICD-10-CM

## 2023-06-07 PROCEDURE — 77063 BREAST TOMOSYNTHESIS BI: CPT

## 2023-07-19 NOTE — OR NURSING
Pt's VSS; denies N/V; states pain is at tolerable level. Dressing CDI to abdomen. D/c orders received. IV dc'd. Pt changed into clothing with assistance. Pt up and ambulated to BR, steady gait, voided adequately. Discharge instructions given as well as pain management handout; pt and family verbalized understanding and questions answered. Patient states ready to d/c home. No prescriptions given, sent electronically to pt pharmacy. Pt dc'd in w/c with RN in stable condition.    Other Procedure: refer to Dr. Torres for evaluation and management X Size Of Lesion In Cm (Optional): 0 Introduction Text (Please End With A Colon): The following procedure was deferred: patient to see Dr. Torres for further evaluation Detail Level: Zone

## 2024-08-23 PROBLEM — Z00.00 WELLNESS EXAMINATION: Status: RESOLVED | Noted: 2022-04-07 | Resolved: 2024-08-23

## 2024-10-31 ENCOUNTER — OFFICE VISIT (OUTPATIENT)
Dept: MEDICAL GROUP | Facility: PHYSICIAN GROUP | Age: 59
End: 2024-10-31
Payer: COMMERCIAL

## 2024-10-31 VITALS
SYSTOLIC BLOOD PRESSURE: 128 MMHG | TEMPERATURE: 98.7 F | HEIGHT: 65 IN | BODY MASS INDEX: 36.82 KG/M2 | WEIGHT: 221 LBS | HEART RATE: 89 BPM | DIASTOLIC BLOOD PRESSURE: 74 MMHG | OXYGEN SATURATION: 96 %

## 2024-10-31 DIAGNOSIS — N20.0 KIDNEY STONE: ICD-10-CM

## 2024-10-31 DIAGNOSIS — E78.00 ELEVATED LDL CHOLESTEROL LEVEL: ICD-10-CM

## 2024-10-31 DIAGNOSIS — F32.A DEPRESSION, UNSPECIFIED DEPRESSION TYPE: ICD-10-CM

## 2024-10-31 DIAGNOSIS — E55.9 VITAMIN D DEFICIENCY: ICD-10-CM

## 2024-10-31 ASSESSMENT — FIBROSIS 4 INDEX: FIB4 SCORE: 0.88

## 2024-11-06 ENCOUNTER — HOSPITAL ENCOUNTER (OUTPATIENT)
Dept: RADIOLOGY | Facility: MEDICAL CENTER | Age: 59
End: 2024-11-06
Attending: FAMILY MEDICINE
Payer: COMMERCIAL

## 2024-11-06 DIAGNOSIS — Z12.31 VISIT FOR SCREENING MAMMOGRAM: ICD-10-CM

## 2024-11-06 PROCEDURE — 77067 SCR MAMMO BI INCL CAD: CPT

## 2024-11-18 ENCOUNTER — HOSPITAL ENCOUNTER (OUTPATIENT)
Dept: LAB | Facility: MEDICAL CENTER | Age: 59
End: 2024-11-18
Attending: FAMILY MEDICINE
Payer: COMMERCIAL

## 2024-11-18 DIAGNOSIS — E55.9 VITAMIN D DEFICIENCY: ICD-10-CM

## 2024-11-18 DIAGNOSIS — E78.00 ELEVATED LDL CHOLESTEROL LEVEL: ICD-10-CM

## 2024-11-18 DIAGNOSIS — N20.0 KIDNEY STONE: ICD-10-CM

## 2024-11-18 DIAGNOSIS — F32.A DEPRESSION, UNSPECIFIED DEPRESSION TYPE: ICD-10-CM

## 2024-11-18 LAB
25(OH)D3 SERPL-MCNC: 22 NG/ML (ref 30–100)
ALBUMIN SERPL BCP-MCNC: 4.2 G/DL (ref 3.2–4.9)
ALBUMIN/GLOB SERPL: 1.6 G/DL
ALP SERPL-CCNC: 89 U/L (ref 30–99)
ALT SERPL-CCNC: 20 U/L (ref 2–50)
ANION GAP SERPL CALC-SCNC: 13 MMOL/L (ref 7–16)
AST SERPL-CCNC: 18 U/L (ref 12–45)
BASOPHILS # BLD AUTO: 0.6 % (ref 0–1.8)
BASOPHILS # BLD: 0.07 K/UL (ref 0–0.12)
BILIRUB SERPL-MCNC: 0.5 MG/DL (ref 0.1–1.5)
BUN SERPL-MCNC: 15 MG/DL (ref 8–22)
CALCIUM ALBUM COR SERPL-MCNC: 8.9 MG/DL (ref 8.5–10.5)
CALCIUM SERPL-MCNC: 9.1 MG/DL (ref 8.5–10.5)
CHLORIDE SERPL-SCNC: 104 MMOL/L (ref 96–112)
CHOLEST SERPL-MCNC: 222 MG/DL (ref 100–199)
CO2 SERPL-SCNC: 23 MMOL/L (ref 20–33)
CREAT SERPL-MCNC: 0.66 MG/DL (ref 0.5–1.4)
EOSINOPHIL # BLD AUTO: 0.55 K/UL (ref 0–0.51)
EOSINOPHIL NFR BLD: 4.7 % (ref 0–6.9)
ERYTHROCYTE [DISTWIDTH] IN BLOOD BY AUTOMATED COUNT: 49.1 FL (ref 35.9–50)
FASTING STATUS PATIENT QL REPORTED: NORMAL
GFR SERPLBLD CREATININE-BSD FMLA CKD-EPI: 102 ML/MIN/1.73 M 2
GLOBULIN SER CALC-MCNC: 2.7 G/DL (ref 1.9–3.5)
GLUCOSE SERPL-MCNC: 76 MG/DL (ref 65–99)
HCT VFR BLD AUTO: 48.3 % (ref 37–47)
HDLC SERPL-MCNC: 56 MG/DL
HGB BLD-MCNC: 15.8 G/DL (ref 12–16)
IMM GRANULOCYTES # BLD AUTO: 0.04 K/UL (ref 0–0.11)
IMM GRANULOCYTES NFR BLD AUTO: 0.3 % (ref 0–0.9)
LDLC SERPL CALC-MCNC: 153 MG/DL
LYMPHOCYTES # BLD AUTO: 4.13 K/UL (ref 1–4.8)
LYMPHOCYTES NFR BLD: 34.9 % (ref 22–41)
MCH RBC QN AUTO: 31.5 PG (ref 27–33)
MCHC RBC AUTO-ENTMCNC: 32.7 G/DL (ref 32.2–35.5)
MCV RBC AUTO: 96.4 FL (ref 81.4–97.8)
MONOCYTES # BLD AUTO: 0.67 K/UL (ref 0–0.85)
MONOCYTES NFR BLD AUTO: 5.7 % (ref 0–13.4)
NEUTROPHILS # BLD AUTO: 6.36 K/UL (ref 1.82–7.42)
NEUTROPHILS NFR BLD: 53.8 % (ref 44–72)
NRBC # BLD AUTO: 0 K/UL
NRBC BLD-RTO: 0 /100 WBC (ref 0–0.2)
PLATELET # BLD AUTO: 284 K/UL (ref 164–446)
PMV BLD AUTO: 10.3 FL (ref 9–12.9)
POTASSIUM SERPL-SCNC: 3.7 MMOL/L (ref 3.6–5.5)
PROT SERPL-MCNC: 6.9 G/DL (ref 6–8.2)
RBC # BLD AUTO: 5.01 M/UL (ref 4.2–5.4)
SODIUM SERPL-SCNC: 140 MMOL/L (ref 135–145)
TRIGL SERPL-MCNC: 67 MG/DL (ref 0–149)
TSH SERPL-ACNC: 2.26 UIU/ML (ref 0.35–5.5)
URATE SERPL-MCNC: 4.6 MG/DL (ref 1.9–8.2)
WBC # BLD AUTO: 11.8 K/UL (ref 4.8–10.8)

## 2024-11-18 PROCEDURE — 82306 VITAMIN D 25 HYDROXY: CPT

## 2024-11-18 PROCEDURE — 84443 ASSAY THYROID STIM HORMONE: CPT

## 2024-11-18 PROCEDURE — 80053 COMPREHEN METABOLIC PANEL: CPT

## 2024-11-18 PROCEDURE — 36415 COLL VENOUS BLD VENIPUNCTURE: CPT

## 2024-11-18 PROCEDURE — 85025 COMPLETE CBC W/AUTO DIFF WBC: CPT

## 2024-11-18 PROCEDURE — 80061 LIPID PANEL: CPT

## 2024-11-18 PROCEDURE — 84550 ASSAY OF BLOOD/URIC ACID: CPT

## 2024-11-21 ENCOUNTER — HOSPITAL ENCOUNTER (OUTPATIENT)
Facility: MEDICAL CENTER | Age: 59
End: 2024-11-21
Attending: FAMILY MEDICINE
Payer: COMMERCIAL

## 2024-11-21 ENCOUNTER — OFFICE VISIT (OUTPATIENT)
Dept: MEDICAL GROUP | Facility: PHYSICIAN GROUP | Age: 59
End: 2024-11-21
Payer: COMMERCIAL

## 2024-11-21 VITALS
TEMPERATURE: 97.7 F | HEART RATE: 88 BPM | RESPIRATION RATE: 18 BRPM | SYSTOLIC BLOOD PRESSURE: 124 MMHG | WEIGHT: 227.2 LBS | BODY MASS INDEX: 37.85 KG/M2 | OXYGEN SATURATION: 96 % | DIASTOLIC BLOOD PRESSURE: 86 MMHG | HEIGHT: 65 IN

## 2024-11-21 DIAGNOSIS — N20.0 KIDNEY STONE: ICD-10-CM

## 2024-11-21 DIAGNOSIS — E55.9 VITAMIN D DEFICIENCY: ICD-10-CM

## 2024-11-21 DIAGNOSIS — N39.9 URINATION DISORDER: ICD-10-CM

## 2024-11-21 DIAGNOSIS — F32.A DEPRESSION, UNSPECIFIED DEPRESSION TYPE: ICD-10-CM

## 2024-11-21 DIAGNOSIS — R31.29 OTHER MICROSCOPIC HEMATURIA: ICD-10-CM

## 2024-11-21 DIAGNOSIS — R63.5 WEIGHT GAIN: ICD-10-CM

## 2024-11-21 LAB
APPEARANCE UR: CLEAR
BILIRUB UR STRIP-MCNC: NORMAL MG/DL
COLOR UR AUTO: YELLOW
GLUCOSE UR STRIP.AUTO-MCNC: NORMAL MG/DL
KETONES UR STRIP.AUTO-MCNC: NORMAL MG/DL
LEUKOCYTE ESTERASE UR QL STRIP.AUTO: NORMAL
NITRITE UR QL STRIP.AUTO: NORMAL
PH UR STRIP.AUTO: 5.5 [PH] (ref 5–8)
PROT UR QL STRIP: NORMAL MG/DL
RBC UR QL AUTO: NORMAL
SP GR UR STRIP.AUTO: 1.02
UROBILINOGEN UR STRIP-MCNC: 0.2 MG/DL

## 2024-11-21 PROCEDURE — 87086 URINE CULTURE/COLONY COUNT: CPT

## 2024-11-21 PROCEDURE — 3074F SYST BP LT 130 MM HG: CPT | Performed by: FAMILY MEDICINE

## 2024-11-21 PROCEDURE — 3079F DIAST BP 80-89 MM HG: CPT | Performed by: FAMILY MEDICINE

## 2024-11-21 PROCEDURE — 81002 URINALYSIS NONAUTO W/O SCOPE: CPT | Performed by: FAMILY MEDICINE

## 2024-11-21 PROCEDURE — 99214 OFFICE O/P EST MOD 30 MIN: CPT | Performed by: FAMILY MEDICINE

## 2024-11-21 RX ORDER — NITROFURANTOIN 25; 75 MG/1; MG/1
100 CAPSULE ORAL 2 TIMES DAILY
Qty: 14 CAPSULE | Refills: 0 | Status: SHIPPED | OUTPATIENT
Start: 2024-11-21 | End: 2024-11-28

## 2024-11-21 ASSESSMENT — FIBROSIS 4 INDEX: FIB4 SCORE: 0.81

## 2024-11-21 ASSESSMENT — PATIENT HEALTH QUESTIONNAIRE - PHQ9
CLINICAL INTERPRETATION OF PHQ2 SCORE: 4
5. POOR APPETITE OR OVEREATING: 1 - SEVERAL DAYS

## 2024-11-21 NOTE — PROGRESS NOTES
Subjective:     CC:   Chief Complaint   Patient presents with    Follow-Up       HPI:   [place] presents today for follow-up.  Patient cannot remember the gentleman she saw for counseling but she knows where his office is I encourage patient to let me know so I can write referral requesting his services for her.  Patient states that she does want to be on any medication at this time she is feeling better.  Patient is here for follow-up of her labs she has noticed that she felt like she was having some issues with her urination so she has been drinking more fluids think she is feeling better.    Past Medical History:   Diagnosis Date    Dental disorder 12/06/2022    partial lower plate    Psychiatric problem 12/06/2022    depression r/t bereavement    Renal disorder 12/06/2022    history of kidney stones    Wellness examination 04/07/2022       Social History     Tobacco Use    Smoking status: Every Day     Current packs/day: 0.50     Average packs/day: 0.5 packs/day for 30.0 years (15.0 ttl pk-yrs)     Types: Cigarettes    Smokeless tobacco: Never   Vaping Use    Vaping status: Former   Substance Use Topics    Alcohol use: Yes     Comment: Occ/soc    Drug use: Not Currently     Comment: history of meth/cocaine use, quit 35 years ago       Current Outpatient Medications Ordered in Epic   Medication Sig Dispense Refill    nitrofurantoin (MACROBID) 100 MG Cap Take 1 Capsule by mouth 2 times a day for 7 days. 14 Capsule 0    naproxen (NAPROSYN) 500 MG Tab Take 1 Tablet by mouth 2 times a day with meals. (Patient not taking: Reported on 10/31/2024) 60 Tablet 0    ondansetron (ZOFRAN ODT) 4 MG TABLET DISPERSIBLE Take 1 Tablet by mouth every 6 hours as needed for Nausea. (Patient not taking: Reported on 10/31/2024) 10 Tablet 0     No current Epic-ordered facility-administered medications on file.       Allergies:  Patient has no known allergies.    Health Maintenance: Completed    ROS:  Gen: no fevers/chills, patient has  "gained weight since of last seen her  Eyes: no changes in vision  ENT: no sore throat, no hearing loss, no bloody nose  Pulm: no sob, no cough  CV: no chest pain, no palpitations  GI: no nausea/vomiting, no diarrhea  Neuro: no headaches, no numbness/tingling  Heme/Lymph: no easy bruising    Objective:     Exam:  /86 (BP Location: Right arm, Patient Position: Sitting, BP Cuff Size: Large adult)   Pulse 88   Temp 36.5 °C (97.7 °F) (Temporal)   Resp 18   Ht 1.651 m (5' 5\")   Wt 103 kg (227 lb 3.2 oz)   SpO2 96%   BMI 37.81 kg/m²  Body mass index is 37.81 kg/m².    Gen: Alert and oriented, No apparent distress.  Skin: Warm and dry.  No obvious lesions.  Eyes: Sclera wnl Pupils normal in size  Lungs: Normal effort, CTA bilaterally, no wheezes, rhonchi, or rales  CV: Regular rate and rhythm. No murmurs, rubs, or gallops.  Musculoskeletal: Normal gait. No extremity cyanosis, clubbing, or edema.  Neuro: Oriented to person, place and time  Psych: Mood is wnl       Assessment & Plan:     59 y.o. female with the following -     1. Urination disorder  Patient has small amount of blood in her urine patient is not having  pain we will go ahead and order urine culture and started on antibiotics.  - POCT Urinalysis  - URINE CULTURE(NEW); Future    2. Other microscopic hematuria  - URINE CULTURE(NEW); Future  - nitrofurantoin (MACROBID) 100 MG Cap; Take 1 Capsule by mouth 2 times a day for 7 days.  Dispense: 14 Capsule; Refill: 0    3. Kidney stone  Patient's uric acid is normal    4. Weight gain  Since TSH is normal    5. Vitamin D deficiency  Patient's vitamin D3 is low would recommend patient start taking 5000 IUs of vitamin D3 per day    6.  Depression  Patient feels she does not need to be on medication at this time patient will let me know who her counselor is so I can write a referral I should see patient back sooner if her symptoms worsen       Return in about 3 weeks (around 12/12/2024), or if symptoms worsen " or fail to improve.    Please note that this dictation was created using voice recognition software. I have made every reasonable attempt to correct obvious errors, but I expect that there are errors of grammar and possibly content that I did not discover before finalizing the note.

## 2024-11-23 LAB
BACTERIA UR CULT: NORMAL
SIGNIFICANT IND 70042: NORMAL
SITE SITE: NORMAL
SOURCE SOURCE: NORMAL

## (undated) DEVICE — LACTATED RINGERS INJ 1000 ML - (14EA/CA 60CA/PF)

## (undated) DEVICE — TUBING CLEARLINK DUO-VENT - C-FLO (48EA/CA)

## (undated) DEVICE — SHEARS MONOPOLAR CURVED  DA VINCI 10X'S REUSABLE

## (undated) DEVICE — SUTURE 2-0 20CM STRATAFIX SPIRAL SH NEEDLE (12/BX)

## (undated) DEVICE — SUTURE 4-0 MONOCRYL PLUS PS-1 - 27 INCH (36/BX)

## (undated) DEVICE — ELECTRODE DUAL RETURN W/ CORD - (50/PK)

## (undated) DEVICE — SET TUBING PNEUMOCLEAR HIGH FLOW SMOKE EVACUATION (10EA/BX)

## (undated) DEVICE — GLOVE BIOGEL SZ 6.5 SURGICAL PF LTX (50PR/BX 4BX/CA)

## (undated) DEVICE — GLOVE BIOGEL PI ORTHO SZ 6 1/2 SURGICAL PF LF (40PR/BX)

## (undated) DEVICE — GOWN WARMING STANDARD FLEX - (30/CA)

## (undated) DEVICE — TUBE E-T HI-LO CUFF 7.0MM (10EA/PK)

## (undated) DEVICE — BIPOLAR FORCE DA VINCI 12X'S REISABLE

## (undated) DEVICE — SLEEVE VASO CALF MED - (10PR/CA)

## (undated) DEVICE — SET LEADWIRE 5 LEAD BEDSIDE DISPOSABLE ECG (1SET OF 5/EA)

## (undated) DEVICE — GLOVE BIOGEL SZ 8 SURGICAL PF LTX - (50PR/BX 4BX/CA)

## (undated) DEVICE — OBTURATOR BLADELESS STANDARD 8MM (6EA/BX)

## (undated) DEVICE — NEEDLE INSFL 120MM 14GA VRRS - (20/BX)

## (undated) DEVICE — SUCTION INSTRUMENT YANKAUER BULBOUS TIP W/O VENT (50EA/CA)

## (undated) DEVICE — GLOVE SZ 7 BIOGEL PI MICRO - PF LF (50PR/BX 4BX/CA)

## (undated) DEVICE — DRAPE COLUMN  BOX OF 20

## (undated) DEVICE — SET EXTENSION WITH 2 PORTS (48EA/CA) ***PART #2C8610 IS A SUBSTITUTE*****

## (undated) DEVICE — SODIUM CHL IRRIGATION 0.9% 1000ML (12EA/CA)

## (undated) DEVICE — DRAPE ARM  BOX OF 20

## (undated) DEVICE — CANISTER SUCTION 3000ML MECHANICAL FILTER AUTO SHUTOFF MEDI-VAC NONSTERILE LF DISP  (40EA/CA)

## (undated) DEVICE — SYSTEM CLEARIFY VISUALIZATION (10EA/PK)

## (undated) DEVICE — COVER LIGHT HANDLE ALC PLUS DISP (18EA/BX)

## (undated) DEVICE — CHLORAPREP 26 ML APPLICATOR - ORANGE TINT(25/CA)

## (undated) DEVICE — COVER TIP ENDOWRIST HOT SHEAR - (10EA/BX) DA VINCI

## (undated) DEVICE — DERMABOND ADVANCED - (12EA/BX)

## (undated) DEVICE — SEAL 5MM-8MM UNIVERSAL  BOX OF 10

## (undated) DEVICE — NEEDLE DRIVER MEGA SUTURECUT DA VINCI 15X'S REUSABLE

## (undated) DEVICE — SENSOR OXIMETER ADULT SPO2 RD SET (20EA/BX)

## (undated) DEVICE — Device

## (undated) DEVICE — SUTURE GENERAL

## (undated) DEVICE — ROBOTIC SURGERY SERVICES

## (undated) DEVICE — DRAPE IOBAN II INCISE 23X17 - (10EA/BX 4BX/CA)